# Patient Record
Sex: MALE | Race: WHITE | NOT HISPANIC OR LATINO | Employment: OTHER | URBAN - NONMETROPOLITAN AREA
[De-identification: names, ages, dates, MRNs, and addresses within clinical notes are randomized per-mention and may not be internally consistent; named-entity substitution may affect disease eponyms.]

---

## 2017-02-09 ENCOUNTER — OFFICE VISIT (OUTPATIENT)
Dept: CARDIOLOGY | Facility: CLINIC | Age: 82
End: 2017-02-09
Payer: MEDICARE

## 2017-02-09 VITALS
DIASTOLIC BLOOD PRESSURE: 70 MMHG | SYSTOLIC BLOOD PRESSURE: 90 MMHG | WEIGHT: 218.7 LBS | OXYGEN SATURATION: 93 % | HEIGHT: 74 IN | BODY MASS INDEX: 28.07 KG/M2 | HEART RATE: 82 BPM

## 2017-02-09 DIAGNOSIS — I48.20 CHRONIC ATRIAL FIBRILLATION (HCC): ICD-10-CM

## 2017-02-09 DIAGNOSIS — I10 ESSENTIAL HYPERTENSION: ICD-10-CM

## 2017-02-09 DIAGNOSIS — I50.9 HEART FAILURE, NYHA CLASS 2 (HCC): ICD-10-CM

## 2017-02-09 DIAGNOSIS — I42.9 CARDIOMYOPATHY (HCC): ICD-10-CM

## 2017-02-09 PROCEDURE — G8420 CALC BMI NORM PARAMETERS: HCPCS | Performed by: INTERNAL MEDICINE

## 2017-02-09 PROCEDURE — 4040F PNEUMOC VAC/ADMIN/RCVD: CPT | Mod: 8P | Performed by: INTERNAL MEDICINE

## 2017-02-09 PROCEDURE — 99214 OFFICE O/P EST MOD 30 MIN: CPT | Performed by: INTERNAL MEDICINE

## 2017-02-09 PROCEDURE — G8432 DEP SCR NOT DOC, RNG: HCPCS | Performed by: INTERNAL MEDICINE

## 2017-02-09 PROCEDURE — 1101F PT FALLS ASSESS-DOCD LE1/YR: CPT | Mod: 8P | Performed by: INTERNAL MEDICINE

## 2017-02-09 PROCEDURE — G8484 FLU IMMUNIZE NO ADMIN: HCPCS | Performed by: INTERNAL MEDICINE

## 2017-02-09 PROCEDURE — 1036F TOBACCO NON-USER: CPT | Performed by: INTERNAL MEDICINE

## 2017-02-09 RX ORDER — DULOXETINE HYDROCHLORIDE 30 MG/1
250 CAPSULE, DELAYED RELEASE ORAL
Refills: 0 | COMMUNITY
Start: 2016-11-30

## 2017-02-09 RX ORDER — FLUTICASONE PROPIONATE 50 MCG
SPRAY, SUSPENSION (ML) NASAL
Refills: 0 | COMMUNITY
Start: 2017-01-20

## 2017-02-09 RX ORDER — ESOMEPRAZOLE MAGNESIUM 40 MG/1
CAPSULE, DELAYED RELEASE ORAL
Refills: 0 | COMMUNITY
Start: 2017-01-30

## 2017-02-09 RX ORDER — METOPROLOL SUCCINATE 50 MG/1
50 TABLET, EXTENDED RELEASE ORAL DAILY
Refills: 0 | COMMUNITY
Start: 2016-11-30 | End: 2018-01-25

## 2017-02-09 RX ORDER — CLOPIDOGREL BISULFATE 75 MG/1
1 TABLET ORAL DAILY
Refills: 0 | COMMUNITY
Start: 2017-01-22 | End: 2017-07-13

## 2017-02-09 RX ORDER — FUROSEMIDE 80 MG
1 TABLET ORAL DAILY
Refills: 0 | COMMUNITY
Start: 2017-01-20

## 2017-02-09 RX ORDER — POTASSIUM CHLORIDE 20MEQ/15ML
LIQUID (ML) ORAL
Refills: 0 | COMMUNITY
Start: 2016-12-01 | End: 2018-04-05

## 2017-02-09 ASSESSMENT — ENCOUNTER SYMPTOMS
CHILLS: 0
PND: 0
BLURRED VISION: 0
CLAUDICATION: 0
DEPRESSION: 0
BLOOD IN STOOL: 0
EYE DISCHARGE: 0
LOSS OF CONSCIOUSNESS: 0
PALPITATIONS: 0
ABDOMINAL PAIN: 0
FEVER: 0
WEIGHT LOSS: 0
HEADACHES: 0
VOMITING: 0
DOUBLE VISION: 0
ORTHOPNEA: 0
MYALGIAS: 0
SENSORY CHANGE: 0
COUGH: 0
BRUISES/BLEEDS EASILY: 0
SHORTNESS OF BREATH: 0
SPEECH CHANGE: 0
DIZZINESS: 1
FALLS: 0
HALLUCINATIONS: 0
EYE PAIN: 0
NAUSEA: 0
BACK PAIN: 1

## 2017-02-09 NOTE — Clinical Note
[facility] [address] [place] [place] [address]  Phone: [phone]  Fax: [phone]              Bro Gutierrez  1935    Encounter Date: 2/9/2017    Fito Bradford M.D.          PROGRESS NOTE:  Subjective:   Bro Gutierrez is a 81 y.o. male who presents today in follow-up for mild cardiomyopathy and chronic atrial fibrillation. In December the patient was hospitalized after several emergency room visits for shortness of breath. He is the son states there was a 50 pound diuresis. These records are not available.  We reviewed his medications today  He reports no edema or shortness of breath and is comfortable with current status.  He is now taking Plavix for stroke prevention.  Previous discussion in October regarding guidelines for stroke prevention resulted in the patient and son decided not to take oral anticoagulants.      Past Medical History   Diagnosis Date   • Atrial fibrillation (CMS-HCC)    • Hypertension      History reviewed. No pertinent past surgical history.  Family History   Problem Relation Age of Onset   • Heart Disease Mother      History   Smoking status   • Never Smoker    Smokeless tobacco   • Never Used     Allergies   Allergen Reactions   • Penicillins      Outpatient Encounter Prescriptions as of 2/9/2017   Medication Sig Dispense Refill   • clopidogrel (PLAVIX) 75 MG Tab Take 1 Tab by mouth every day.  0   • DIGITEK 250 MCG Tab Take 250 mcg by mouth.  0   • esomeprazole (NEXIUM) 40 MG delayed-release capsule   0   • fluticasone (FLONASE) 50 MCG/ACT nasal spray   0   • furosemide (LASIX) 80 MG Tab Take 1 Tab by mouth every day.  0   • metoprolol SR (TOPROL XL) 50 MG TABLET SR 24 HR Take 50 mg by mouth every day.  0   • potassium chloride (KAYCIEL) 20 MEQ/15ML (10%) Solution   0   • tamsulosin (FLOMAX) 0.4 MG capsule   0   • gemfibrozil (LOPID) 600 MG Tab TAKE 1 TABLET BY MOUTH TWICE A DAY  0   • PROAIR  (90 BASE)  "MCG/ACT Aero Soln inhalation aerosol inhale 2 puffs by mouth every 4 hours  0   • lisinopril (PRINIVIL) 10 MG Tab Take 1 Tab by mouth every day. (Patient taking differently: Take 5 mg by mouth every day.) 30 Tab 11   • finasteride (PROSCAR) 5 MG Tab   0   • azithromycin (ZITHROMAX) 250 MG Tab take 2 tablets by mouth today then take 1 tablet DAILY FOR 4 DAYS  0   • Azelastine HCl 0.15 % Solution Spray 1 Spray in nose 2 Times a Day. Each Nostril  0   • metoprolol SR (TOPROL XL) 100 MG TABLET SR 24 HR Take 1 Tab by mouth every day. (Patient not taking: Reported on 2/9/2017) 30 Tab 11     No facility-administered encounter medications on file as of 2/9/2017.     Review of Systems   Constitutional: Negative for fever, chills, weight loss and malaise/fatigue.   HENT: Negative for ear discharge, ear pain, hearing loss and nosebleeds.    Eyes: Negative for blurred vision, double vision, pain and discharge.   Respiratory: Negative for cough and shortness of breath.    Cardiovascular: Negative for chest pain, palpitations, orthopnea, claudication, leg swelling and PND.   Gastrointestinal: Negative for nausea, vomiting, abdominal pain, blood in stool and melena.   Genitourinary: Negative for dysuria and hematuria.   Musculoskeletal: Positive for back pain. Negative for myalgias, joint pain and falls.   Skin: Negative for itching and rash.   Neurological: Positive for dizziness. Negative for sensory change, speech change, loss of consciousness and headaches.   Endo/Heme/Allergies: Negative for environmental allergies. Does not bruise/bleed easily.   Psychiatric/Behavioral: Negative for depression, suicidal ideas and hallucinations.        Objective:   BP 90/70 mmHg  Pulse 82  Ht 1.88 m (6' 2.02\")  Wt 99.2 kg (218 lb 11.1 oz)  BMI 28.07 kg/m2  SpO2 93%    Physical Exam   Constitutional: He is oriented to person, place, and time. He appears well-developed and well-nourished.   Elderly man using a walker for ambulation " accompanied by son   CHAYA:   Head: Normocephalic and atraumatic.   Eyes: EOM are normal.   Neck: Normal range of motion. No JVD present.   Cardiovascular: Normal rate, normal heart sounds and intact distal pulses.  Exam reveals no gallop and no friction rub.    No murmur heard.  There is presence of an irregularly irregular heartbeats.     Pulmonary/Chest: No respiratory distress. He has no wheezes. He has no rales. He exhibits no tenderness.   Abdominal: Soft. Bowel sounds are normal. There is no tenderness. There is no rebound and no guarding.   The is no presence of abdominal bruits   Musculoskeletal: Normal range of motion. He exhibits no edema or tenderness.   Neurological: He is alert and oriented to person, place, and time.   Skin: Skin is warm and dry.   Psychiatric: He has a normal mood and affect.   Nursing note and vitals reviewed.      Assessment:     1. Chronic atrial fibrillation (CMS-HCC)     2. Cardiomyopathy (CMS-HCC)     3. Heart failure, NYHA class 2 (CMS-HCC)     4. Essential hypertension         Medical Decision Making:  Today's Assessment / Status / Plan:   Current status is stable.  No evidence of CHF today  He is on Plavix for stroke prevention.  We discussed the guidelines again today  Return in 3 months  No change in medication  Get records from hospitalization in December        Alfredo Peña M.D.  [address] [place] [address]  VIA Facsimile: [phone]

## 2017-02-09 NOTE — PROGRESS NOTES
Subjective:   Bro Gutierrez is a 81 y.o. male who presents today in follow-up for mild cardiomyopathy and chronic atrial fibrillation. In December the patient was hospitalized after several emergency room visits for shortness of breath. He is the son states there was a 50 pound diuresis. These records are not available.  We reviewed his medications today  He reports no edema or shortness of breath and is comfortable with current status.  He is now taking Plavix for stroke prevention.  Previous discussion in October regarding guidelines for stroke prevention resulted in the patient and son decided not to take oral anticoagulants.      Past Medical History   Diagnosis Date   • Atrial fibrillation (CMS-[facility])    • Hypertension      History reviewed. No pertinent past surgical history.  Family History   Problem Relation Age of Onset   • Heart Disease Mother      History   Smoking status   • Never Smoker    Smokeless tobacco   • Never Used     Allergies   Allergen Reactions   • Penicillins      Outpatient Encounter Prescriptions as of 2/9/2017   Medication Sig Dispense Refill   • clopidogrel (PLAVIX) 75 MG Tab Take 1 Tab by mouth every day.  0   • DIGITEK 250 MCG Tab Take 250 mcg by mouth.  0   • esomeprazole (NEXIUM) 40 MG delayed-release capsule   0   • fluticasone (FLONASE) 50 MCG/ACT nasal spray   0   • furosemide (LASIX) 80 MG Tab Take 1 Tab by mouth every day.  0   • metoprolol SR (TOPROL XL) 50 MG TABLET SR 24 HR Take 50 mg by mouth every day.  0   • potassium chloride (KAYCIEL) 20 MEQ/15ML (10%) Solution   0   • tamsulosin (FLOMAX) 0.4 MG capsule   0   • gemfibrozil (LOPID) 600 MG Tab TAKE 1 TABLET BY MOUTH TWICE A DAY  0   • PROAIR  (90 BASE) MCG/ACT Aero Soln inhalation aerosol inhale 2 puffs by mouth every 4 hours  0   • lisinopril (PRINIVIL) 10 MG Tab Take 1 Tab by mouth every day. (Patient taking differently: Take 5 mg by mouth every day.) 30 Tab 11   • finasteride (PROSCAR) 5 MG Tab   0   • azithromycin  "(ZITHROMAX) 250 MG Tab take 2 tablets by mouth today then take 1 tablet DAILY FOR 4 DAYS  0   • Azelastine HCl 0.15 % Solution Spray 1 Spray in nose 2 Times a Day. Each Nostril  0   • metoprolol SR (TOPROL XL) 100 MG TABLET SR 24 HR Take 1 Tab by mouth every day. (Patient not taking: Reported on 2/9/2017) 30 Tab 11     No facility-administered encounter medications on file as of 2/9/2017.     Review of Systems   Constitutional: Negative for fever, chills, weight loss and malaise/fatigue.   HENT: Negative for ear discharge, ear pain, hearing loss and nosebleeds.    Eyes: Negative for blurred vision, double vision, pain and discharge.   Respiratory: Negative for cough and shortness of breath.    Cardiovascular: Negative for chest pain, palpitations, orthopnea, claudication, leg swelling and PND.   Gastrointestinal: Negative for nausea, vomiting, abdominal pain, blood in stool and melena.   Genitourinary: Negative for dysuria and hematuria.   Musculoskeletal: Positive for back pain. Negative for myalgias, joint pain and falls.   Skin: Negative for itching and rash.   Neurological: Positive for dizziness. Negative for sensory change, speech change, loss of consciousness and headaches.   Endo/Heme/Allergies: Negative for environmental allergies. Does not bruise/bleed easily.   Psychiatric/Behavioral: Negative for depression, suicidal ideas and hallucinations.        Objective:   BP 90/70 mmHg  Pulse 82  Ht 1.88 m (6' 2.02\")  Wt 99.2 kg (218 lb 11.1 oz)  BMI 28.07 kg/m2  SpO2 93%    Physical Exam   Constitutional: He is oriented to person, place, and time. He appears well-developed and well-nourished.   Elderly man using a walker for ambulation accompanied by son   HENT:   Head: Normocephalic and atraumatic.   Eyes: EOM are normal.   Neck: Normal range of motion. No JVD present.   Cardiovascular: Normal rate, normal heart sounds and intact distal pulses.  Exam reveals no gallop and no friction rub.    No murmur " heard.  There is presence of an irregularly irregular heartbeats.     Pulmonary/Chest: No respiratory distress. He has no wheezes. He has no rales. He exhibits no tenderness.   Abdominal: Soft. Bowel sounds are normal. There is no tenderness. There is no rebound and no guarding.   The is no presence of abdominal bruits   Musculoskeletal: Normal range of motion. He exhibits no edema or tenderness.   Neurological: He is alert and oriented to person, place, and time.   Skin: Skin is warm and dry.   Psychiatric: He has a normal mood and affect.   Nursing note and vitals reviewed.      Assessment:     1. Chronic atrial fibrillation (CMS-HCC)     2. Cardiomyopathy (CMS-HCC)     3. Heart failure, NYHA class 2 (CMS-HCC)     4. Essential hypertension         Medical Decision Making:  Today's Assessment / Status / Plan:   Current status is stable.  No evidence of CHF today  He is on Plavix for stroke prevention.  We discussed the guidelines again today  Return in 3 months  No change in medication  Get records from hospitalization in December

## 2017-06-22 ENCOUNTER — OFFICE VISIT (OUTPATIENT)
Dept: CARDIOLOGY | Facility: CLINIC | Age: 82
End: 2017-06-22
Payer: MEDICARE

## 2017-06-22 VITALS
HEART RATE: 80 BPM | SYSTOLIC BLOOD PRESSURE: 124 MMHG | DIASTOLIC BLOOD PRESSURE: 70 MMHG | WEIGHT: 215 LBS | OXYGEN SATURATION: 96 % | BODY MASS INDEX: 27.59 KG/M2 | HEIGHT: 74 IN

## 2017-06-22 DIAGNOSIS — I50.9 HEART FAILURE, NYHA CLASS 3 (HCC): ICD-10-CM

## 2017-06-22 DIAGNOSIS — I10 ESSENTIAL HYPERTENSION: ICD-10-CM

## 2017-06-22 DIAGNOSIS — I50.20 ACC/AHA STAGE C SYSTOLIC HEART FAILURE (HCC): ICD-10-CM

## 2017-06-22 DIAGNOSIS — I48.20 CHRONIC ATRIAL FIBRILLATION (HCC): ICD-10-CM

## 2017-06-22 PROCEDURE — 99214 OFFICE O/P EST MOD 30 MIN: CPT | Performed by: INTERNAL MEDICINE

## 2017-06-22 ASSESSMENT — ENCOUNTER SYMPTOMS
EYE DISCHARGE: 0
DIZZINESS: 0
FEVER: 0
VOMITING: 0
CLAUDICATION: 0
HEADACHES: 0
DOUBLE VISION: 0
WEIGHT LOSS: 0
CHILLS: 0
HALLUCINATIONS: 0
DEPRESSION: 0
NAUSEA: 0
SHORTNESS OF BREATH: 1
PND: 0
BRUISES/BLEEDS EASILY: 0
LOSS OF CONSCIOUSNESS: 0
EYE PAIN: 0
FALLS: 0
BLOOD IN STOOL: 0
SPEECH CHANGE: 0
SENSORY CHANGE: 0
PALPITATIONS: 0
COUGH: 0
BLURRED VISION: 0
MYALGIAS: 0
ORTHOPNEA: 0
ABDOMINAL PAIN: 0

## 2017-06-22 NOTE — Clinical Note
[facility] [address] [place] [address]  Phone: [phone]  Fax: [phone]              Bro Gutierrez  1935    Encounter Date: 6/22/2017    Crys Jorge M.D.          PROGRESS NOTE:  Subjective:   Bro Gutierrez is a 82 y.o. male who presents today for cardiac evaluation and care after his prior hospitalization due to HF exacerbation in 12/2016. Patient was seen in the hospital and found to have atrial fibirilllation with rapid ventricular rate. Patient was seen by cardiology and elevated troponin was thought to be attributed by tachycardia. In terms of his history of atrial fibrillation, patient did not have close cardiac care. He was told in the past not to worry about it and was not on any type of anticoagulation. He feels well overall. No chest pain.    LVEF is 45% on TTE (could be underestimated due to presence of atrial fib).    Walks with a walker.  Has moved into assisted living now.    No clinical change since last visit.    Past Medical History   Diagnosis Date   • Atrial fibrillation (CMS-HCC)    • Hypertension      History reviewed. No pertinent past surgical history.  Family History   Problem Relation Age of Onset   • Heart Disease Mother      History   Smoking status   • Never Smoker    Smokeless tobacco   • Never Used     Allergies   Allergen Reactions   • Penicillins      Outpatient Encounter Prescriptions as of 6/22/2017   Medication Sig Dispense Refill   • clopidogrel (PLAVIX) 75 MG Tab Take 1 Tab by mouth every day.  0   • DIGITEK 250 MCG Tab Take 250 mcg by mouth.  0   • esomeprazole (NEXIUM) 40 MG delayed-release capsule   0   • fluticasone (FLONASE) 50 MCG/ACT nasal spray   0   • furosemide (LASIX) 80 MG Tab Take 1 Tab by mouth every day.  0   • metoprolol SR (TOPROL XL) 50 MG TABLET SR 24 HR Take 50 mg by mouth every day.  0   • potassium chloride (KAYCIEL) 20 MEQ/15ML (10%) Solution   0   • tamsulosin  "(FLOMAX) 0.4 MG capsule   0   • gemfibrozil (LOPID) 600 MG Tab TAKE 1 TABLET BY MOUTH TWICE A DAY  0   • finasteride (PROSCAR) 5 MG Tab   0   • azithromycin (ZITHROMAX) 250 MG Tab take 2 tablets by mouth today then take 1 tablet DAILY FOR 4 DAYS  0   • Azelastine HCl 0.15 % Solution Spray 1 Spray in nose 2 Times a Day. Each Nostril  0   • PROAIR  (90 BASE) MCG/ACT Aero Soln inhalation aerosol inhale 2 puffs by mouth every 4 hours  0   • metoprolol SR (TOPROL XL) 100 MG TABLET SR 24 HR Take 1 Tab by mouth every day. 30 Tab 11   • lisinopril (PRINIVIL) 10 MG Tab Take 1 Tab by mouth every day. (Patient taking differently: Take 5 mg by mouth every day.) 30 Tab 11     No facility-administered encounter medications on file as of 6/22/2017.     Review of Systems   Constitutional: Negative for fever, chills, weight loss and malaise/fatigue.   HENT: Negative for ear discharge, ear pain, hearing loss and nosebleeds.    Eyes: Negative for blurred vision, double vision, pain and discharge.   Respiratory: Positive for shortness of breath. Negative for cough.    Cardiovascular: Negative for chest pain, palpitations, orthopnea, claudication, leg swelling and PND.   Gastrointestinal: Negative for nausea, vomiting, abdominal pain, blood in stool and melena.   Genitourinary: Negative for dysuria and hematuria.   Musculoskeletal: Negative for myalgias, joint pain and falls.   Skin: Negative for itching and rash.   Neurological: Negative for dizziness, sensory change, speech change, loss of consciousness and headaches.   Endo/Heme/Allergies: Negative for environmental allergies. Does not bruise/bleed easily.   Psychiatric/Behavioral: Negative for depression, suicidal ideas and hallucinations.        Objective:   /70 mmHg  Pulse 80  Ht 1.88 m (6' 2\")  Wt 97.523 kg (215 lb)  BMI 27.59 kg/m2  SpO2 96%    Physical Exam   Constitutional: He is oriented to person, place, and time. No distress.   HENT:   Head: Normocephalic " and atraumatic.   Eyes: EOM are normal.   Neck: Normal range of motion. No JVD present.   Cardiovascular: Normal rate, normal heart sounds and intact distal pulses.  Exam reveals no gallop and no friction rub.    No murmur heard.  There is presence of an irregularly irregular heartbeats.     Pulmonary/Chest: No respiratory distress. He has no wheezes. He has no rales. He exhibits no tenderness.   Abdominal: Soft. Bowel sounds are normal. There is no tenderness. There is no rebound and no guarding.   The is no presence of abdominal bruits   Musculoskeletal: He exhibits no edema or tenderness.   Neurological: He is alert and oriented to person, place, and time.   Skin: Skin is warm and dry.   Psychiatric: He has a normal mood and affect.   Nursing note and vitals reviewed.      Assessment:     1. ACC/AHA stage C systolic heart failure (CMS-HCC)     2. Chronic atrial fibrillation (CMS-HCC)     3. Essential hypertension     4. Heart failure, NYHA class 3 (CMS-HCC)         Medical Decision Making:  Today's Assessment / Status / Plan:     Today, based on physical examination findings, patient is euvolemic. No JVD, lungs are clear to auscultation, no pitting edema in bilateral lower extremities, no ascites.    Based on the overall clinical history and profile, patient is a good candidate for Cardiomems implantation system to remotely monitor intracardiac pressures. he will benefit from reduced recurrent hospitalization for heart failure exacerbation and also quality of life improvement. Patient also has a good support system and has shown compliance to medical therapy. he is motivated and will be a successful candidate.    In the meantime, cont current medications at current dose for optimal rate control.      Alfredo Peña M.D.  [address] [place] [address] [place] [address]  VIA Facsimile: [phone]

## 2017-06-22 NOTE — PROGRESS NOTES
Subjective:   Bro Gutierrez is a 82 y.o. male who presents today for cardiac evaluation and care after his prior hospitalization due to HF exacerbation in 12/2016. Patient was seen in the hospital and found to have atrial fibirilllation with rapid ventricular rate. Patient was seen by cardiology and elevated troponin was thought to be attributed by tachycardia. In terms of his history of atrial fibrillation, patient did not have close cardiac care. He was told in the past not to worry about it and was not on any type of anticoagulation. He feels well overall. No chest pain.    LVEF is 45% on TTE (could be underestimated due to presence of atrial fib).    Walks with a walker.  Has moved into assisted living now.    No clinical change since last visit.    Past Medical History   Diagnosis Date   • Atrial fibrillation (CMS-HCC)    • Hypertension      History reviewed. No pertinent past surgical history.  Family History   Problem Relation Age of Onset   • Heart Disease Mother      History   Smoking status   • Never Smoker    Smokeless tobacco   • Never Used     Allergies   Allergen Reactions   • Penicillins      Outpatient Encounter Prescriptions as of 6/22/2017   Medication Sig Dispense Refill   • clopidogrel (PLAVIX) 75 MG Tab Take 1 Tab by mouth every day.  0   • DIGITEK 250 MCG Tab Take 250 mcg by mouth.  0   • esomeprazole (NEXIUM) 40 MG delayed-release capsule   0   • fluticasone (FLONASE) 50 MCG/ACT nasal spray   0   • furosemide (LASIX) 80 MG Tab Take 1 Tab by mouth every day.  0   • metoprolol SR (TOPROL XL) 50 MG TABLET SR 24 HR Take 50 mg by mouth every day.  0   • potassium chloride (KAYCIEL) 20 MEQ/15ML (10%) Solution   0   • tamsulosin (FLOMAX) 0.4 MG capsule   0   • gemfibrozil (LOPID) 600 MG Tab TAKE 1 TABLET BY MOUTH TWICE A DAY  0   • finasteride (PROSCAR) 5 MG Tab   0   • azithromycin (ZITHROMAX) 250 MG Tab take 2 tablets by mouth today then take 1 tablet DAILY FOR 4 DAYS  0   • Azelastine HCl 0.15 %  "Solution Spray 1 Spray in nose 2 Times a Day. Each Nostril  0   • PROAIR  (90 BASE) MCG/ACT Aero Soln inhalation aerosol inhale 2 puffs by mouth every 4 hours  0   • metoprolol SR (TOPROL XL) 100 MG TABLET SR 24 HR Take 1 Tab by mouth every day. 30 Tab 11   • lisinopril (PRINIVIL) 10 MG Tab Take 1 Tab by mouth every day. (Patient taking differently: Take 5 mg by mouth every day.) 30 Tab 11     No facility-administered encounter medications on file as of 6/22/2017.     Review of Systems   Constitutional: Negative for fever, chills, weight loss and malaise/fatigue.   HENT: Negative for ear discharge, ear pain, hearing loss and nosebleeds.    Eyes: Negative for blurred vision, double vision, pain and discharge.   Respiratory: Positive for shortness of breath. Negative for cough.    Cardiovascular: Negative for chest pain, palpitations, orthopnea, claudication, leg swelling and PND.   Gastrointestinal: Negative for nausea, vomiting, abdominal pain, blood in stool and melena.   Genitourinary: Negative for dysuria and hematuria.   Musculoskeletal: Negative for myalgias, joint pain and falls.   Skin: Negative for itching and rash.   Neurological: Negative for dizziness, sensory change, speech change, loss of consciousness and headaches.   Endo/Heme/Allergies: Negative for environmental allergies. Does not bruise/bleed easily.   Psychiatric/Behavioral: Negative for depression, suicidal ideas and hallucinations.        Objective:   /70 mmHg  Pulse 80  Ht 1.88 m (6' 2\")  Wt 97.523 kg (215 lb)  BMI 27.59 kg/m2  SpO2 96%    Physical Exam   Constitutional: He is oriented to person, place, and time. No distress.   HENT:   Head: Normocephalic and atraumatic.   Eyes: EOM are normal.   Neck: Normal range of motion. No JVD present.   Cardiovascular: Normal rate, normal heart sounds and intact distal pulses.  Exam reveals no gallop and no friction rub.    No murmur heard.  There is presence of an irregularly " irregular heartbeats.     Pulmonary/Chest: No respiratory distress. He has no wheezes. He has no rales. He exhibits no tenderness.   Abdominal: Soft. Bowel sounds are normal. There is no tenderness. There is no rebound and no guarding.   The is no presence of abdominal bruits   Musculoskeletal: He exhibits no edema or tenderness.   Neurological: He is alert and oriented to person, place, and time.   Skin: Skin is warm and dry.   Psychiatric: He has a normal mood and affect.   Nursing note and vitals reviewed.      Assessment:     1. ACC/AHA stage C systolic heart failure (CMS-HCC)     2. Chronic atrial fibrillation (CMS-HCC)     3. Essential hypertension     4. Heart failure, NYHA class 3 (CMS-HCC)         Medical Decision Making:  Today's Assessment / Status / Plan:     Today, based on physical examination findings, patient is euvolemic. No JVD, lungs are clear to auscultation, no pitting edema in bilateral lower extremities, no ascites.    Based on the overall clinical history and profile, patient is a good candidate for Cardiomems implantation system to remotely monitor intracardiac pressures. he will benefit from reduced recurrent hospitalization for heart failure exacerbation and also quality of life improvement. Patient also has a good support system and has shown compliance to medical therapy. he is motivated and will be a successful candidate.    In the meantime, cont current medications at current dose for optimal rate control.

## 2017-07-13 ENCOUNTER — TELEPHONE (OUTPATIENT)
Dept: CARDIOLOGY | Facility: MEDICAL CENTER | Age: 82
End: 2017-07-13

## 2017-07-13 NOTE — TELEPHONE ENCOUNTER
Question about patient taking Plavix  Received: Today       PATY Brambila/Judith     Please call Amanda at Dr Peña's office at [phone]. She wants to find out if the patient is still taking Plavix.       Returned call. Not in notes and med still on med rec. Will clarify with TT. TT states OK for patient to stop Plavix. Amanda notified. Med taken off med rec.

## 2017-10-05 ENCOUNTER — TELEPHONE (OUTPATIENT)
Dept: CARDIOLOGY | Facility: MEDICAL CENTER | Age: 82
End: 2017-10-05

## 2017-10-05 NOTE — TELEPHONE ENCOUNTER
Amanda at Dr Peña's office at [phone] called in response to Dr. Peña's letter dated 9/26/2017    We discuss our previous conversation 7/13/2017. That patient is not on Plavix to our knowledge. She states pt lives in a care home and she received a recent med list during patients last visit and plavix is not on there either, but Aspirin 325 mg is.     Amanda will update Dr. Peña and keep us advised if anything further is needed.

## 2017-10-17 ENCOUNTER — TELEPHONE (OUTPATIENT)
Dept: CARDIOLOGY | Facility: MEDICAL CENTER | Age: 82
End: 2017-10-17

## 2017-10-17 NOTE — TELEPHONE ENCOUNTER
Phone number on file called. Pts son answers. He states he has no idea what doses his Dad takes. He asks that I call the assisted living where the patient resides [facility] @ [phone].     Called, Left message with staff, will have staff call back with Lisinopril dose.

## 2017-10-23 RX ORDER — LISINOPRIL 5 MG/1
5 TABLET ORAL DAILY
Qty: 90 TAB | Refills: 3 | Status: SHIPPED | OUTPATIENT
Start: 2017-10-23

## 2017-10-23 NOTE — TELEPHONE ENCOUNTER
Called [facility] @ [phone] again. Spoke with Rosina. She confirms pt is taking 1/2 tab of Lisinopril 10 mg daily. Pt is taking 5mg daily.

## 2017-12-22 ENCOUNTER — TELEPHONE (OUTPATIENT)
Dept: CARDIOLOGY | Facility: MEDICAL CENTER | Age: 82
End: 2017-12-22

## 2017-12-22 NOTE — TELEPHONE ENCOUNTER
To Dr. Jorge for review,  Marilia GUTIERREZ RN     ----- Message from Ange [place] sent at 12/22/2017 11:14 AM PST -----  Regarding: doctor to doctor  Contact: [phone]  TT/marilia Ordonez,  at [facility] calling on behalf of Dr Edouard Hdez.   Dr Hdez wishes to have doctor to doctor conversation with TT, in between patients, to discuss pt's meds.    Dr Jaime # [phone]    If questions, Mery # [phone]

## 2018-01-25 ENCOUNTER — OFFICE VISIT (OUTPATIENT)
Dept: CARDIOLOGY | Facility: CLINIC | Age: 83
End: 2018-01-25
Payer: MEDICARE

## 2018-01-25 VITALS
SYSTOLIC BLOOD PRESSURE: 120 MMHG | BODY MASS INDEX: 27.59 KG/M2 | OXYGEN SATURATION: 94 % | WEIGHT: 215 LBS | DIASTOLIC BLOOD PRESSURE: 88 MMHG | HEIGHT: 74 IN | HEART RATE: 50 BPM

## 2018-01-25 DIAGNOSIS — I10 ESSENTIAL HYPERTENSION: ICD-10-CM

## 2018-01-25 DIAGNOSIS — I50.9 HEART FAILURE, NYHA CLASS 3 (HCC): ICD-10-CM

## 2018-01-25 DIAGNOSIS — I48.20 CHRONIC ATRIAL FIBRILLATION (HCC): ICD-10-CM

## 2018-01-25 DIAGNOSIS — I50.20 ACC/AHA STAGE C SYSTOLIC HEART FAILURE (HCC): ICD-10-CM

## 2018-01-25 PROCEDURE — 99497 ADVNCD CARE PLAN 30 MIN: CPT | Performed by: INTERNAL MEDICINE

## 2018-01-25 PROCEDURE — 99215 OFFICE O/P EST HI 40 MIN: CPT | Performed by: INTERNAL MEDICINE

## 2018-01-25 RX ORDER — METOPROLOL SUCCINATE 100 MG/1
100 TABLET, EXTENDED RELEASE ORAL DAILY
Qty: 30 TAB | Refills: 11 | Status: SHIPPED | OUTPATIENT
Start: 2018-01-25 | End: 2018-02-08

## 2018-01-25 RX ORDER — CARVEDILOL 3.12 MG/1
TABLET ORAL
Refills: 0 | COMMUNITY
Start: 2018-01-18 | End: 2018-01-25

## 2018-01-25 ASSESSMENT — ENCOUNTER SYMPTOMS
EYE DISCHARGE: 0
PND: 0
SENSORY CHANGE: 0
DIAPHORESIS: 0
DEPRESSION: 0
VOMITING: 0
HALLUCINATIONS: 0
DOUBLE VISION: 0
CHILLS: 0
EYE PAIN: 0
DIZZINESS: 0
FALLS: 0
BLURRED VISION: 0
SPEECH CHANGE: 0
MEMORY LOSS: 0
FEVER: 0
COUGH: 0
CLAUDICATION: 0
PALPITATIONS: 0
HEADACHES: 0
ORTHOPNEA: 0
NAUSEA: 0
MYALGIAS: 0
LOSS OF CONSCIOUSNESS: 0
SHORTNESS OF BREATH: 0
WEIGHT LOSS: 0
BRUISES/BLEEDS EASILY: 0
ABDOMINAL PAIN: 0
BLOOD IN STOOL: 0

## 2018-01-25 NOTE — LETTER
[facility] [address] [place] [address]  Phone: [phone]  Fax: [phone]              Bro Matt  1935    Encounter Date: 1/25/2018    Crys Jorge M.D.          PROGRESS NOTE:  No notes on file      Alfredo Peña M.D.  [address] [place] [address]  VIA Facsimile: [phone]

## 2018-01-25 NOTE — PROGRESS NOTES
Subjective:   Bro Gutierrez is a 82 y.o. male who presents today for cardiac evaluation and care after his prior hospitalization due to HF exacerbation in 12/2016 and again in 12/2017. Patient was seen in the hospital and found to have atrial fibirilllation with rapid ventricular rate. He did not take his Digoxin.     Patient still gets winded with daily living activities and exertion. No symptoms at rest.    He is taking Coreg 3.125 mg po bid, Metoprolol tartrate 50 mg po daily, Lisinopril 5 mg po daily. Lasix 80 mg po daily. Very unusual regimen of BB. This is according to paper record. Patient does not know exactly what he is taking.     LVEF is 30% on most recent TTE.     Walks with a walker.  Has moved into assisted living now.     No clinical change since last visit.    Chief Complaint: dyspnea.    Past Medical History:   Diagnosis Date   • Atrial fibrillation (CMS-HCC)    • Hypertension      History reviewed. No pertinent surgical history.  Family History   Problem Relation Age of Onset   • Heart Disease Mother      History   Smoking Status   • Never Smoker   Smokeless Tobacco   • Never Used     Allergies   Allergen Reactions   • Penicillins      Outpatient Encounter Prescriptions as of 1/25/2018   Medication Sig Dispense Refill   • metoprolol SR (TOPROL XL) 100 MG TABLET SR 24 HR Take 1 Tab by mouth every day. 30 Tab 11   • lisinopril (PRINIVIL) 5 MG Tab Take 1 Tab by mouth every day. 90 Tab 3   • DIGITEK 250 MCG Tab Take 250 mcg by mouth.  0   • esomeprazole (NEXIUM) 40 MG delayed-release capsule   0   • fluticasone (FLONASE) 50 MCG/ACT nasal spray   0   • furosemide (LASIX) 80 MG Tab Take 1 Tab by mouth every day.  0   • potassium chloride (KAYCIEL) 20 MEQ/15ML (10%) Solution   0   • tamsulosin (FLOMAX) 0.4 MG capsule   0   • gemfibrozil (LOPID) 600 MG Tab TAKE 1 TABLET BY MOUTH TWICE A DAY  0   • finasteride (PROSCAR) 5 MG Tab   0   • azithromycin (ZITHROMAX) 250 MG Tab take 2 tablets by mouth today then  "take 1 tablet DAILY FOR 4 DAYS  0   • Azelastine HCl 0.15 % Solution Spray 1 Spray in nose 2 Times a Day. Each Nostril  0   • PROAIR  (90 BASE) MCG/ACT Aero Soln inhalation aerosol inhale 2 puffs by mouth every 4 hours  0   • aspirin EC (ECOTRIN) 325 MG Tablet Delayed Response   0   • [DISCONTINUED] carvedilol (COREG) 3.125 MG Tab   0   • [DISCONTINUED] metoprolol SR (TOPROL XL) 50 MG TABLET SR 24 HR Take 50 mg by mouth every day.  0   • [DISCONTINUED] metoprolol SR (TOPROL XL) 100 MG TABLET SR 24 HR Take 1 Tab by mouth every day. 30 Tab 11     No facility-administered encounter medications on file as of 1/25/2018.      Review of Systems   Constitutional: Negative for chills, diaphoresis, fever, malaise/fatigue and weight loss.   HENT: Negative for ear discharge, ear pain, hearing loss and nosebleeds.    Eyes: Negative for blurred vision, double vision, pain and discharge.   Respiratory: Negative for cough and shortness of breath.    Cardiovascular: Negative for chest pain, palpitations, orthopnea, claudication, leg swelling and PND.   Gastrointestinal: Negative for abdominal pain, blood in stool, melena, nausea and vomiting.   Genitourinary: Negative for dysuria, frequency and hematuria.   Musculoskeletal: Negative for falls, joint pain and myalgias.   Skin: Negative for itching and rash.   Neurological: Negative for dizziness, sensory change, speech change, loss of consciousness and headaches.   Endo/Heme/Allergies: Negative for environmental allergies. Does not bruise/bleed easily.   Psychiatric/Behavioral: Negative for depression, hallucinations, memory loss and suicidal ideas.        Objective:   /88   Pulse (!) 50   Ht 1.88 m (6' 2\")   Wt 97.5 kg (215 lb)   SpO2 94%   BMI 27.60 kg/m²     Physical Exam   Constitutional: He is oriented to person, place, and time. No distress.   HENT:   Head: Normocephalic and atraumatic.   Eyes: EOM are normal.   Neck: Normal range of motion. No JVD present. "   Cardiovascular: Normal rate, normal heart sounds and intact distal pulses.  Exam reveals no gallop and no friction rub.    No murmur heard.  There is presence of an irregularly irregular heartbeats.     Pulmonary/Chest: No respiratory distress. He has no wheezes. He has no rales. He exhibits no tenderness.   Abdominal: Soft. Bowel sounds are normal. There is no tenderness. There is no rebound and no guarding.   The is no presence of abdominal bruits   Musculoskeletal: Normal range of motion.   Neurological: He is alert and oriented to person, place, and time.   Skin: Skin is warm and dry.   Psychiatric: He has a normal mood and affect.   Nursing note and vitals reviewed.      Assessment:     1. ACC/AHA stage C systolic heart failure (CMS-HCC)  metoprolol SR (TOPROL XL) 100 MG TABLET SR 24 HR   2. Heart failure, NYHA class 3 (CMS-HCC)  metoprolol SR (TOPROL XL) 100 MG TABLET SR 24 HR   3. Chronic atrial fibrillation (CMS-HCC)  metoprolol SR (TOPROL XL) 100 MG TABLET SR 24 HR   4. Essential hypertension         Medical Decision Making:  Today's Assessment / Status / Plan:   Today, based on physical examination findings, patient is euvolemic. No JVD, lungs are clear to auscultation, no pitting edema in bilateral lower extremities, no ascites.    Dry weight is 215 lbs.    Very challenging patient.  I advised patient to bring all of his medication bottles with him at next visit to make sure that he is taking the right medications.  In the meantime, we will stop Carvedilol  Will substitute with Toprol 100 mg po daily.  Continue Lasix 80 mg po daily.  Not good candidate for Spironolactone at this time. Will re-evaluate in the future.  Not good candidate for anticoagulation due to fall risk.    I spent 30 minutes talking to patient face to face about end of life issues. I already had POLST form filled out. He does not want any invasive procedure done outside of [place]. He has trouble getting to [place]. Therefore, ICD is  not a good option for him. Cardiomems implantation is not good option for him.    I will see patient back in our Clinic with in 1 week for closer monitor.    I thank you for referring patient to our Heart Failure Clinic today.

## 2018-02-08 ENCOUNTER — OFFICE VISIT (OUTPATIENT)
Dept: CARDIOLOGY | Facility: CLINIC | Age: 83
End: 2018-02-08
Payer: MEDICARE

## 2018-02-08 VITALS
SYSTOLIC BLOOD PRESSURE: 130 MMHG | HEIGHT: 74 IN | BODY MASS INDEX: 26.95 KG/M2 | HEART RATE: 65 BPM | WEIGHT: 210 LBS | OXYGEN SATURATION: 98 % | DIASTOLIC BLOOD PRESSURE: 82 MMHG

## 2018-02-08 DIAGNOSIS — I48.20 CHRONIC ATRIAL FIBRILLATION (HCC): ICD-10-CM

## 2018-02-08 DIAGNOSIS — I50.9 HEART FAILURE, NYHA CLASS 3 (HCC): ICD-10-CM

## 2018-02-08 DIAGNOSIS — Z79.899 HIGH RISK MEDICATION USE: ICD-10-CM

## 2018-02-08 DIAGNOSIS — I50.20 ACC/AHA STAGE C SYSTOLIC HEART FAILURE (HCC): ICD-10-CM

## 2018-02-08 DIAGNOSIS — I10 HTN (HYPERTENSION), MALIGNANT: ICD-10-CM

## 2018-02-08 PROCEDURE — 99215 OFFICE O/P EST HI 40 MIN: CPT | Performed by: INTERNAL MEDICINE

## 2018-02-08 RX ORDER — METOPROLOL SUCCINATE 50 MG/1
50 TABLET, EXTENDED RELEASE ORAL DAILY
Qty: 90 TAB | Refills: 3 | Status: SHIPPED | OUTPATIENT
Start: 2018-02-08 | End: 2018-04-05

## 2018-02-08 ASSESSMENT — ENCOUNTER SYMPTOMS
PALPITATIONS: 0
ABDOMINAL PAIN: 0
FALLS: 0
DIAPHORESIS: 0
HEADACHES: 0
DEPRESSION: 0
BRUISES/BLEEDS EASILY: 0
FEVER: 0
MYALGIAS: 0
BLURRED VISION: 0
MEMORY LOSS: 0
COUGH: 0
SENSORY CHANGE: 0
SHORTNESS OF BREATH: 1
DOUBLE VISION: 0
DIZZINESS: 0

## 2018-02-08 NOTE — LETTER
[facility] [address] [place] [address]  Phone: [phone]  Fax: [phone]              Bro Gutierrez  1935    Encounter Date: 2/8/2018    Crys Jorge M.D.          PROGRESS NOTE:  Subjective:   Bro Gutierrez is a 82 y.o. male who presents today for cardiac evaluation and care after his prior hospitalization due to HF exacerbation in 12/2016 and again in 12/2017. Patient was seen in the hospital and found to have atrial fibirilllation with rapid ventricular rate. He did not take his Digoxin.      Patient still gets winded with daily living activities and exertion. No symptoms at rest.     He is taking  Metoprolol ER 50 mg po daily, Lisinopril 5 mg po daily. Lasix 80 mg po daily.      LVEF is 30% on most recent TTE.     Walks with a walker.  Has moved into assisted living now.     No clinical change since last visit.     Chief Complaint: dyspnea.       Past Medical History:   Diagnosis Date   • Atrial fibrillation (CMS-HCC)    • Hypertension      History reviewed. No pertinent surgical history.  Family History   Problem Relation Age of Onset   • Heart Disease Mother      History   Smoking Status   • Never Smoker   Smokeless Tobacco   • Never Used     Allergies   Allergen Reactions   • Penicillins      Outpatient Encounter Prescriptions as of 2/8/2018   Medication Sig Dispense Refill   • metoprolol SR (TOPROL XL) 50 MG TABLET SR 24 HR Take 1 Tab by mouth every day. 90 Tab 3   • aspirin EC (ECOTRIN) 325 MG Tablet Delayed Response   0   • lisinopril (PRINIVIL) 5 MG Tab Take 1 Tab by mouth every day. 90 Tab 3   • DIGITEK 250 MCG Tab Take 250 mcg by mouth.  0   • esomeprazole (NEXIUM) 40 MG delayed-release capsule   0   • fluticasone (FLONASE) 50 MCG/ACT nasal spray   0   • furosemide (LASIX) 80 MG Tab Take 1 Tab by mouth every day.  0   • potassium chloride (KAYCIEL) 20 MEQ/15ML (10%) Solution   0   • tamsulosin (FLOMAX) 0.4 MG capsule  "  0   • gemfibrozil (LOPID) 600 MG Tab TAKE 1 TABLET BY MOUTH TWICE A DAY  0   • finasteride (PROSCAR) 5 MG Tab   0   • azithromycin (ZITHROMAX) 250 MG Tab take 2 tablets by mouth today then take 1 tablet DAILY FOR 4 DAYS  0   • Azelastine HCl 0.15 % Solution Spray 1 Spray in nose 2 Times a Day. Each Nostril  0   • PROAIR  (90 BASE) MCG/ACT Aero Soln inhalation aerosol inhale 2 puffs by mouth every 4 hours  0   • [DISCONTINUED] metoprolol SR (TOPROL XL) 100 MG TABLET SR 24 HR Take 1 Tab by mouth every day. 30 Tab 11     No facility-administered encounter medications on file as of 2/8/2018.      Review of Systems   Constitutional: Negative for diaphoresis and fever.   HENT: Negative for nosebleeds.    Eyes: Negative for blurred vision and double vision.   Respiratory: Positive for shortness of breath. Negative for cough.    Cardiovascular: Negative for chest pain and palpitations.   Gastrointestinal: Negative for abdominal pain.   Genitourinary: Negative for dysuria and frequency.   Musculoskeletal: Negative for falls and myalgias.   Skin: Negative for rash.   Neurological: Negative for dizziness, sensory change and headaches.   Endo/Heme/Allergies: Does not bruise/bleed easily.   Psychiatric/Behavioral: Negative for depression and memory loss.        Objective:   /82   Pulse 65   Ht 1.88 m (6' 2\")   Wt 95.3 kg (210 lb)   SpO2 98%   BMI 26.96 kg/m²      Physical Exam   Constitutional: He is oriented to person, place, and time. No distress.   HENT:   Head: Normocephalic and atraumatic.   Eyes: Right eye exhibits no discharge. Left eye exhibits no discharge.   Neck: No JVD present.   Cardiovascular: Exam reveals no friction rub.    No murmur heard.  There is presence of an irregularly irregular heartbeats.     Pulmonary/Chest: No respiratory distress.   Abdominal: He exhibits no distension. There is no tenderness.   Musculoskeletal: He exhibits no edema or tenderness.   Neurological: He is alert and " oriented to person, place, and time.   Skin: Skin is warm and dry.   Psychiatric: He has a normal mood and affect.   Nursing note and vitals reviewed.      Assessment:     1. ACC/AHA stage C systolic heart failure (CMS-HCC)  metoprolol SR (TOPROL XL) 50 MG TABLET SR 24 HR   2. Heart failure, NYHA class 3 (CMS-HCC)  metoprolol SR (TOPROL XL) 50 MG TABLET SR 24 HR   3. Chronic atrial fibrillation (CMS-HCC)  metoprolol SR (TOPROL XL) 50 MG TABLET SR 24 HR   4. HTN (hypertension), malignant  metoprolol SR (TOPROL XL) 50 MG TABLET SR 24 HR   5. High risk medication use         Medical Decision Making:  Today's Assessment / Status / Plan:   Today, based on physical examination findings, patient is euvolemic. No JVD, lungs are clear to auscultation, no pitting edema in bilateral lower extremities, no ascites.    Dry weight is 210 lbs.    Continue Toprol 50 mg po daily, Lisinopril 5 mg po daily, lasix 80 mg po daily.    Not good candidate for Spironolactone at this time. Will re-evaluate in the future.  Not good candidate for anticoagulation due to fall risk.    POLST filled out last visit.    Will continue to closely monitor for side effects of patient's high risk medication(s) including liver, renal function and electrolytes.    I will see patient back in our Heart Failure Clinic with lab tests and studies results in 12 weeks.    I thank you for referring patient to our Heart Failure Clinic today.          Alfredo Peña M.D.  [address] [place] [address]  VIA Facsimile: [phone]

## 2018-02-08 NOTE — PROGRESS NOTES
Subjective:   Bro Gutierrez is a 82 y.o. male who presents today for cardiac evaluation and care after his prior hospitalization due to HF exacerbation in 12/2016 and again in 12/2017. Patient was seen in the hospital and found to have atrial fibirilllation with rapid ventricular rate. He did not take his Digoxin.      Patient still gets winded with daily living activities and exertion. No symptoms at rest.     He is taking  Metoprolol ER 50 mg po daily, Lisinopril 5 mg po daily. Lasix 80 mg po daily.      LVEF is 30% on most recent TTE.     Walks with a walker.  Has moved into assisted living now.     No clinical change since last visit.     Chief Complaint: dyspnea.       Past Medical History:   Diagnosis Date   • Atrial fibrillation (CMS-HCC)    • Hypertension      History reviewed. No pertinent surgical history.  Family History   Problem Relation Age of Onset   • Heart Disease Mother      History   Smoking Status   • Never Smoker   Smokeless Tobacco   • Never Used     Allergies   Allergen Reactions   • Penicillins      Outpatient Encounter Prescriptions as of 2/8/2018   Medication Sig Dispense Refill   • metoprolol SR (TOPROL XL) 50 MG TABLET SR 24 HR Take 1 Tab by mouth every day. 90 Tab 3   • aspirin EC (ECOTRIN) 325 MG Tablet Delayed Response   0   • lisinopril (PRINIVIL) 5 MG Tab Take 1 Tab by mouth every day. 90 Tab 3   • DIGITEK 250 MCG Tab Take 250 mcg by mouth.  0   • esomeprazole (NEXIUM) 40 MG delayed-release capsule   0   • fluticasone (FLONASE) 50 MCG/ACT nasal spray   0   • furosemide (LASIX) 80 MG Tab Take 1 Tab by mouth every day.  0   • potassium chloride (KAYCIEL) 20 MEQ/15ML (10%) Solution   0   • tamsulosin (FLOMAX) 0.4 MG capsule   0   • gemfibrozil (LOPID) 600 MG Tab TAKE 1 TABLET BY MOUTH TWICE A DAY  0   • finasteride (PROSCAR) 5 MG Tab   0   • azithromycin (ZITHROMAX) 250 MG Tab take 2 tablets by mouth today then take 1 tablet DAILY FOR 4 DAYS  0   • Azelastine HCl 0.15 % Solution Spray 1  "Spray in nose 2 Times a Day. Each Nostril  0   • PROAIR  (90 BASE) MCG/ACT Aero Soln inhalation aerosol inhale 2 puffs by mouth every 4 hours  0   • [DISCONTINUED] metoprolol SR (TOPROL XL) 100 MG TABLET SR 24 HR Take 1 Tab by mouth every day. 30 Tab 11     No facility-administered encounter medications on file as of 2/8/2018.      Review of Systems   Constitutional: Negative for diaphoresis and fever.   HENT: Negative for nosebleeds.    Eyes: Negative for blurred vision and double vision.   Respiratory: Positive for shortness of breath. Negative for cough.    Cardiovascular: Negative for chest pain and palpitations.   Gastrointestinal: Negative for abdominal pain.   Genitourinary: Negative for dysuria and frequency.   Musculoskeletal: Negative for falls and myalgias.   Skin: Negative for rash.   Neurological: Negative for dizziness, sensory change and headaches.   Endo/Heme/Allergies: Does not bruise/bleed easily.   Psychiatric/Behavioral: Negative for depression and memory loss.        Objective:   /82   Pulse 65   Ht 1.88 m (6' 2\")   Wt 95.3 kg (210 lb)   SpO2 98%   BMI 26.96 kg/m²     Physical Exam   Constitutional: He is oriented to person, place, and time. No distress.   HENT:   Head: Normocephalic and atraumatic.   Eyes: Right eye exhibits no discharge. Left eye exhibits no discharge.   Neck: No JVD present.   Cardiovascular: Exam reveals no friction rub.    No murmur heard.  There is presence of an irregularly irregular heartbeats.     Pulmonary/Chest: No respiratory distress.   Abdominal: He exhibits no distension. There is no tenderness.   Musculoskeletal: He exhibits no edema or tenderness.   Neurological: He is alert and oriented to person, place, and time.   Skin: Skin is warm and dry.   Psychiatric: He has a normal mood and affect.   Nursing note and vitals reviewed.      Assessment:     1. ACC/AHA stage C systolic heart failure (CMS-HCC)  metoprolol SR (TOPROL XL) 50 MG TABLET SR 24 " HR   2. Heart failure, NYHA class 3 (CMS-HCC)  metoprolol SR (TOPROL XL) 50 MG TABLET SR 24 HR   3. Chronic atrial fibrillation (CMS-HCC)  metoprolol SR (TOPROL XL) 50 MG TABLET SR 24 HR   4. HTN (hypertension), malignant  metoprolol SR (TOPROL XL) 50 MG TABLET SR 24 HR   5. High risk medication use         Medical Decision Making:  Today's Assessment / Status / Plan:   Today, based on physical examination findings, patient is euvolemic. No JVD, lungs are clear to auscultation, no pitting edema in bilateral lower extremities, no ascites.    Dry weight is 210 lbs.    Continue Toprol 50 mg po daily, Lisinopril 5 mg po daily, lasix 80 mg po daily.    Not good candidate for Spironolactone at this time. Will re-evaluate in the future.  Not good candidate for anticoagulation due to fall risk.    POLST filled out last visit.    Will continue to closely monitor for side effects of patient's high risk medication(s) including liver, renal function and electrolytes.    I will see patient back in our Heart Failure Clinic with lab tests and studies results in 12 weeks.    I thank you for referring patient to our Heart Failure Clinic today.

## 2018-02-16 ENCOUNTER — TELEPHONE (OUTPATIENT)
Dept: CARDIOLOGY | Facility: MEDICAL CENTER | Age: 83
End: 2018-02-16

## 2018-02-16 NOTE — TELEPHONE ENCOUNTER
Hospital needs lab order faxed to them   Received: Today   Message Contents   Delisa Dash R.N.             HARIKA/Judith Jacobs at [facility] at [phone] called. She needs a lab order faxed to her and her fax number is [phone].      Faxed with receipt confirmation.

## 2018-02-22 ENCOUNTER — HOSPITAL ENCOUNTER (INPATIENT)
Dept: HOSPITAL 8 - 5SO | Age: 83
LOS: 3 days | Discharge: HOME | DRG: 292 | End: 2018-02-25
Attending: HOSPITALIST | Admitting: HOSPITALIST
Payer: MEDICARE

## 2018-02-22 VITALS — DIASTOLIC BLOOD PRESSURE: 87 MMHG | SYSTOLIC BLOOD PRESSURE: 145 MMHG

## 2018-02-22 VITALS — HEIGHT: 74 IN | BODY MASS INDEX: 24.39 KG/M2 | WEIGHT: 190.04 LBS

## 2018-02-22 VITALS — DIASTOLIC BLOOD PRESSURE: 101 MMHG | SYSTOLIC BLOOD PRESSURE: 152 MMHG

## 2018-02-22 VITALS — SYSTOLIC BLOOD PRESSURE: 163 MMHG | DIASTOLIC BLOOD PRESSURE: 99 MMHG

## 2018-02-22 DIAGNOSIS — E83.42: ICD-10-CM

## 2018-02-22 DIAGNOSIS — Z85.72: ICD-10-CM

## 2018-02-22 DIAGNOSIS — I11.0: Primary | ICD-10-CM

## 2018-02-22 DIAGNOSIS — I48.91: ICD-10-CM

## 2018-02-22 DIAGNOSIS — Z87.891: ICD-10-CM

## 2018-02-22 DIAGNOSIS — E78.5: ICD-10-CM

## 2018-02-22 DIAGNOSIS — Z80.0: ICD-10-CM

## 2018-02-22 DIAGNOSIS — N40.0: ICD-10-CM

## 2018-02-22 DIAGNOSIS — Z88.0: ICD-10-CM

## 2018-02-22 DIAGNOSIS — M19.90: ICD-10-CM

## 2018-02-22 DIAGNOSIS — K21.9: ICD-10-CM

## 2018-02-22 DIAGNOSIS — E87.6: ICD-10-CM

## 2018-02-22 DIAGNOSIS — D68.69: ICD-10-CM

## 2018-02-22 DIAGNOSIS — Z79.899: ICD-10-CM

## 2018-02-22 DIAGNOSIS — Z96.653: ICD-10-CM

## 2018-02-22 DIAGNOSIS — I50.23: ICD-10-CM

## 2018-02-22 DIAGNOSIS — Z92.3: ICD-10-CM

## 2018-02-22 DIAGNOSIS — Z79.82: ICD-10-CM

## 2018-02-22 DIAGNOSIS — R09.02: ICD-10-CM

## 2018-02-22 DIAGNOSIS — Z87.442: ICD-10-CM

## 2018-02-22 DIAGNOSIS — I25.10: ICD-10-CM

## 2018-02-22 DIAGNOSIS — Z92.21: ICD-10-CM

## 2018-02-22 LAB
<PLATELET ESTIMATE>: ADEQUATE
ANION GAP SERPL CALC-SCNC: 9 MMOL/L (ref 5–15)
ANISOCYTOSIS BLD QL SMEAR: (no result)
CALCIUM SERPL-MCNC: 8.8 MG/DL (ref 8.5–10.1)
CHLORIDE SERPL-SCNC: 102 MMOL/L (ref 98–107)
CREAT SERPL-MCNC: 0.9 MG/DL (ref 0.7–1.3)
ERYTHROCYTE [DISTWIDTH] IN BLOOD BY AUTOMATED COUNT: 17.7 % (ref 9.4–14.8)
LG PLATELETS BLD QL SMEAR: (no result)
LYMPH#(MANUAL): 0.69 X10^3/UL (ref 1–3.4)
LYMPHS% (MANUAL): 10 % (ref 22–44)
MCH RBC QN AUTO: 29.6 PG (ref 27.5–34.5)
MCHC RBC AUTO-ENTMCNC: 33.3 G/DL (ref 33.2–36.2)
MCV RBC AUTO: 88.9 FL (ref 81–97)
MD: YES
MONOS#(MANUAL): 0.41 X10^3/UL (ref 0.3–2.7)
MONOS% (MANUAL): 6 % (ref 2–9)
PLATELET # BLD AUTO: 239 X10^3/UL (ref 130–400)
PMV BLD AUTO: 9.8 FL (ref 7.4–10.4)
POLYCHROMASIA BLD QL SMEAR: (no result)
RBC # BLD AUTO: 4.82 X10^6/UL (ref 4.38–5.82)
SEG#(MANUAL): 5.8 X10^3/UL (ref 1.8–6.8)
SEGS% (MANUAL): 84 % (ref 42–75)
TROPONIN I SERPL-MCNC: 0.08 NG/ML (ref 0–0.04)
TROPONIN I SERPL-MCNC: 0.09 NG/ML (ref 0–0.04)
TSH SERPL-ACNC: 3.4 MIU/L (ref 0.36–3.74)

## 2018-02-22 PROCEDURE — 80048 BASIC METABOLIC PNL TOTAL CA: CPT

## 2018-02-22 PROCEDURE — 84443 ASSAY THYROID STIM HORMONE: CPT

## 2018-02-22 PROCEDURE — 82040 ASSAY OF SERUM ALBUMIN: CPT

## 2018-02-22 PROCEDURE — 83735 ASSAY OF MAGNESIUM: CPT

## 2018-02-22 PROCEDURE — 80053 COMPREHEN METABOLIC PANEL: CPT

## 2018-02-22 PROCEDURE — 93306 TTE W/DOPPLER COMPLETE: CPT

## 2018-02-22 PROCEDURE — 80162 ASSAY OF DIGOXIN TOTAL: CPT

## 2018-02-22 PROCEDURE — 93005 ELECTROCARDIOGRAM TRACING: CPT

## 2018-02-22 PROCEDURE — 84484 ASSAY OF TROPONIN QUANT: CPT

## 2018-02-22 PROCEDURE — 85025 COMPLETE CBC W/AUTO DIFF WBC: CPT

## 2018-02-22 PROCEDURE — 80061 LIPID PANEL: CPT

## 2018-02-22 PROCEDURE — 84100 ASSAY OF PHOSPHORUS: CPT

## 2018-02-22 PROCEDURE — 36415 COLL VENOUS BLD VENIPUNCTURE: CPT

## 2018-02-22 RX ADMIN — POTASSIUM CHLORIDE SCH MEQ: 750 TABLET, FILM COATED, EXTENDED RELEASE ORAL at 20:53

## 2018-02-22 RX ADMIN — FLUTICASONE PROPIONATE SCH SPRAY: 50 SPRAY, METERED NASAL at 21:50

## 2018-02-22 RX ADMIN — ENOXAPARIN SODIUM SCH MG: 40 INJECTION SUBCUTANEOUS at 20:56

## 2018-02-22 RX ADMIN — CARVEDILOL SCH MG: 3.12 TABLET, FILM COATED ORAL at 18:19

## 2018-02-22 RX ADMIN — FUROSEMIDE SCH MG: 10 INJECTION, SOLUTION INTRAMUSCULAR; INTRAVENOUS at 18:19

## 2018-02-22 RX ADMIN — GEMFIBROZIL SCH MG: 600 TABLET, FILM COATED ORAL at 20:53

## 2018-02-23 VITALS — SYSTOLIC BLOOD PRESSURE: 112 MMHG | DIASTOLIC BLOOD PRESSURE: 66 MMHG

## 2018-02-23 VITALS — SYSTOLIC BLOOD PRESSURE: 133 MMHG | DIASTOLIC BLOOD PRESSURE: 85 MMHG

## 2018-02-23 VITALS — DIASTOLIC BLOOD PRESSURE: 84 MMHG | SYSTOLIC BLOOD PRESSURE: 127 MMHG

## 2018-02-23 VITALS — SYSTOLIC BLOOD PRESSURE: 158 MMHG | DIASTOLIC BLOOD PRESSURE: 83 MMHG

## 2018-02-23 LAB
ANION GAP SERPL CALC-SCNC: 9 MMOL/L (ref 5–15)
BASOPHILS # BLD AUTO: 0.01 X10^3/UL (ref 0–0.1)
BASOPHILS NFR BLD AUTO: 0 % (ref 0–1)
CALCIUM SERPL-MCNC: 8.6 MG/DL (ref 8.5–10.1)
CHLORIDE SERPL-SCNC: 102 MMOL/L (ref 98–107)
CHOL/HDL RATIO: 4.1
CREAT SERPL-MCNC: 0.83 MG/DL (ref 0.7–1.3)
EOSINOPHIL # BLD AUTO: 0.01 X10^3/UL (ref 0–0.4)
EOSINOPHIL NFR BLD AUTO: 0 % (ref 1–7)
ERYTHROCYTE [DISTWIDTH] IN BLOOD BY AUTOMATED COUNT: 17.4 % (ref 9.4–14.8)
HDL CHOL %: 24 % (ref 26–37)
HDL CHOLESTEROL (DIRECT): 29 MG/DL (ref 40–60)
LDL CHOLESTEROL,CALCULATED: 59 MG/DL (ref 54–169)
LDLC/HDLC SERPL: 2 {RATIO} (ref 0.5–3)
LYMPHOCYTES # BLD AUTO: 0.62 X10^3/UL (ref 1–3.4)
LYMPHOCYTES NFR BLD AUTO: 8 % (ref 22–44)
MCH RBC QN AUTO: 28.6 PG (ref 27.5–34.5)
MCHC RBC AUTO-ENTMCNC: 32.2 G/DL (ref 33.2–36.2)
MCV RBC AUTO: 88.6 FL (ref 81–97)
MD: NO
MONOCYTES # BLD AUTO: 0.78 X10^3/UL (ref 0.2–0.8)
MONOCYTES NFR BLD AUTO: 10 % (ref 2–9)
NEUTROPHILS # BLD AUTO: 6.2 X10^3/UL (ref 1.8–6.8)
NEUTROPHILS NFR BLD AUTO: 81 % (ref 42–75)
PLATELET # BLD AUTO: 217 X10^3/UL (ref 130–400)
PMV BLD AUTO: 9.2 FL (ref 7.4–10.4)
RBC # BLD AUTO: 4.65 X10^6/UL (ref 4.38–5.82)
TRIGL SERPL-MCNC: 162 MG/DL (ref 50–200)
TROPONIN I SERPL-MCNC: 0.08 NG/ML (ref 0–0.04)
VLDLC SERPL CALC-MCNC: 32 MG/DL (ref 0–25)

## 2018-02-23 RX ADMIN — POTASSIUM CHLORIDE SCH MEQ: 20 TABLET, EXTENDED RELEASE ORAL at 17:20

## 2018-02-23 RX ADMIN — GEMFIBROZIL SCH MG: 600 TABLET, FILM COATED ORAL at 21:05

## 2018-02-23 RX ADMIN — POTASSIUM CHLORIDE SCH MEQ: 20 TABLET, EXTENDED RELEASE ORAL at 13:53

## 2018-02-23 RX ADMIN — FINASTERIDE SCH MG: 5 TABLET, FILM COATED ORAL at 10:15

## 2018-02-23 RX ADMIN — TAMSULOSIN HYDROCHLORIDE SCH MG: 0.4 CAPSULE ORAL at 10:14

## 2018-02-23 RX ADMIN — OXYCODONE HYDROCHLORIDE PRN MG: 5 TABLET ORAL at 10:14

## 2018-02-23 RX ADMIN — GEMFIBROZIL SCH MG: 600 TABLET, FILM COATED ORAL at 10:15

## 2018-02-23 RX ADMIN — CARVEDILOL SCH MG: 3.12 TABLET, FILM COATED ORAL at 06:25

## 2018-02-23 RX ADMIN — FUROSEMIDE SCH MG: 10 INJECTION, SOLUTION INTRAMUSCULAR; INTRAVENOUS at 17:20

## 2018-02-23 RX ADMIN — POTASSIUM CHLORIDE SCH MEQ: 750 TABLET, FILM COATED, EXTENDED RELEASE ORAL at 10:14

## 2018-02-23 RX ADMIN — Medication SCH MG: at 10:16

## 2018-02-23 RX ADMIN — POTASSIUM CHLORIDE SCH MEQ: 20 TABLET, EXTENDED RELEASE ORAL at 10:14

## 2018-02-23 RX ADMIN — ENOXAPARIN SODIUM SCH MG: 40 INJECTION SUBCUTANEOUS at 21:06

## 2018-02-23 RX ADMIN — FLUTICASONE PROPIONATE SCH SPRAY: 50 SPRAY, METERED NASAL at 21:06

## 2018-02-23 RX ADMIN — CARVEDILOL SCH MG: 6.25 TABLET, FILM COATED ORAL at 17:20

## 2018-02-23 RX ADMIN — FUROSEMIDE SCH MG: 10 INJECTION, SOLUTION INTRAMUSCULAR; INTRAVENOUS at 10:16

## 2018-02-23 RX ADMIN — ASPIRIN SCH MG: 81 TABLET, COATED ORAL at 06:25

## 2018-02-23 RX ADMIN — PANTOPRAZOLE SODIUM SCH MG: 40 TABLET, DELAYED RELEASE ORAL at 10:15

## 2018-02-23 RX ADMIN — LISINOPRIL SCH MG: 10 TABLET ORAL at 10:15

## 2018-02-24 VITALS — DIASTOLIC BLOOD PRESSURE: 78 MMHG | SYSTOLIC BLOOD PRESSURE: 135 MMHG

## 2018-02-24 VITALS — DIASTOLIC BLOOD PRESSURE: 89 MMHG | SYSTOLIC BLOOD PRESSURE: 121 MMHG

## 2018-02-24 VITALS — SYSTOLIC BLOOD PRESSURE: 106 MMHG | DIASTOLIC BLOOD PRESSURE: 74 MMHG

## 2018-02-24 VITALS — DIASTOLIC BLOOD PRESSURE: 79 MMHG | SYSTOLIC BLOOD PRESSURE: 128 MMHG

## 2018-02-24 VITALS — DIASTOLIC BLOOD PRESSURE: 85 MMHG | SYSTOLIC BLOOD PRESSURE: 128 MMHG

## 2018-02-24 LAB
ALBUMIN SERPL-MCNC: 3 G/DL (ref 3.4–5)
ALP SERPL-CCNC: 77 U/L (ref 45–117)
ALT SERPL-CCNC: 12 U/L (ref 12–78)
ANION GAP SERPL CALC-SCNC: 8 MMOL/L (ref 5–15)
BILIRUB SERPL-MCNC: 0.8 MG/DL (ref 0.2–1)
CALCIUM SERPL-MCNC: 8.6 MG/DL (ref 8.5–10.1)
CHLORIDE SERPL-SCNC: 103 MMOL/L (ref 98–107)
CREAT SERPL-MCNC: 0.95 MG/DL (ref 0.7–1.3)
PROT SERPL-MCNC: 6.6 G/DL (ref 6.4–8.2)

## 2018-02-24 RX ADMIN — ASPIRIN SCH MG: 81 TABLET, COATED ORAL at 05:23

## 2018-02-24 RX ADMIN — FLUTICASONE PROPIONATE SCH SPRAY: 50 SPRAY, METERED NASAL at 20:49

## 2018-02-24 RX ADMIN — GEMFIBROZIL SCH MG: 600 TABLET, FILM COATED ORAL at 07:28

## 2018-02-24 RX ADMIN — FUROSEMIDE SCH MG: 10 INJECTION, SOLUTION INTRAMUSCULAR; INTRAVENOUS at 07:28

## 2018-02-24 RX ADMIN — PANTOPRAZOLE SODIUM SCH MG: 40 TABLET, DELAYED RELEASE ORAL at 07:27

## 2018-02-24 RX ADMIN — OXYCODONE HYDROCHLORIDE PRN MG: 5 TABLET ORAL at 07:30

## 2018-02-24 RX ADMIN — CARVEDILOL SCH MG: 6.25 TABLET, FILM COATED ORAL at 18:02

## 2018-02-24 RX ADMIN — CARVEDILOL SCH MG: 6.25 TABLET, FILM COATED ORAL at 05:23

## 2018-02-24 RX ADMIN — GEMFIBROZIL SCH MG: 600 TABLET, FILM COATED ORAL at 20:49

## 2018-02-24 RX ADMIN — Medication SCH MG: at 07:27

## 2018-02-24 RX ADMIN — LISINOPRIL SCH MG: 10 TABLET ORAL at 07:27

## 2018-02-24 RX ADMIN — ENOXAPARIN SODIUM SCH MG: 40 INJECTION SUBCUTANEOUS at 20:50

## 2018-02-24 RX ADMIN — TAMSULOSIN HYDROCHLORIDE SCH MG: 0.4 CAPSULE ORAL at 07:27

## 2018-02-24 RX ADMIN — POTASSIUM CHLORIDE SCH MEQ: 750 TABLET, FILM COATED, EXTENDED RELEASE ORAL at 07:27

## 2018-02-24 RX ADMIN — FINASTERIDE SCH MG: 5 TABLET, FILM COATED ORAL at 07:28

## 2018-02-25 VITALS — SYSTOLIC BLOOD PRESSURE: 117 MMHG | DIASTOLIC BLOOD PRESSURE: 75 MMHG

## 2018-02-25 VITALS — DIASTOLIC BLOOD PRESSURE: 65 MMHG | SYSTOLIC BLOOD PRESSURE: 101 MMHG

## 2018-02-25 LAB
ALBUMIN SERPL-MCNC: 2.8 G/DL (ref 3.4–5)
ANION GAP SERPL CALC-SCNC: 10 MMOL/L (ref 5–15)
BASOPHILS # BLD AUTO: 0.02 X10^3/UL (ref 0–0.1)
BASOPHILS NFR BLD AUTO: 0 % (ref 0–1)
CALCIUM SERPL-MCNC: 8.8 MG/DL (ref 8.5–10.1)
CHLORIDE SERPL-SCNC: 102 MMOL/L (ref 98–107)
CREAT SERPL-MCNC: 1.03 MG/DL (ref 0.7–1.3)
EOSINOPHIL # BLD AUTO: 0.03 X10^3/UL (ref 0–0.4)
EOSINOPHIL NFR BLD AUTO: 0 % (ref 1–7)
ERYTHROCYTE [DISTWIDTH] IN BLOOD BY AUTOMATED COUNT: 18.2 % (ref 9.4–14.8)
LYMPHOCYTES # BLD AUTO: 0.79 X10^3/UL (ref 1–3.4)
LYMPHOCYTES NFR BLD AUTO: 10 % (ref 22–44)
MCH RBC QN AUTO: 28.9 PG (ref 27.5–34.5)
MCHC RBC AUTO-ENTMCNC: 32 G/DL (ref 33.2–36.2)
MCV RBC AUTO: 90.2 FL (ref 81–97)
MD: NO
MONOCYTES # BLD AUTO: 0.93 X10^3/UL (ref 0.2–0.8)
MONOCYTES NFR BLD AUTO: 12 % (ref 2–9)
NEUTROPHILS # BLD AUTO: 6.3 X10^3/UL (ref 1.8–6.8)
NEUTROPHILS NFR BLD AUTO: 78 % (ref 42–75)
PLATELET # BLD AUTO: 226 X10^3/UL (ref 130–400)
PMV BLD AUTO: 9.5 FL (ref 7.4–10.4)
RBC # BLD AUTO: 4.72 X10^6/UL (ref 4.38–5.82)

## 2018-02-25 RX ADMIN — ASPIRIN SCH MG: 81 TABLET, COATED ORAL at 06:09

## 2018-02-25 RX ADMIN — GEMFIBROZIL SCH MG: 600 TABLET, FILM COATED ORAL at 09:59

## 2018-02-25 RX ADMIN — TAMSULOSIN HYDROCHLORIDE SCH MG: 0.4 CAPSULE ORAL at 09:59

## 2018-02-25 RX ADMIN — POTASSIUM CHLORIDE SCH MEQ: 750 TABLET, FILM COATED, EXTENDED RELEASE ORAL at 09:59

## 2018-02-25 RX ADMIN — CARVEDILOL SCH MG: 6.25 TABLET, FILM COATED ORAL at 06:09

## 2018-02-25 RX ADMIN — Medication SCH MG: at 10:00

## 2018-02-25 RX ADMIN — FINASTERIDE SCH MG: 5 TABLET, FILM COATED ORAL at 09:00

## 2018-02-25 RX ADMIN — LISINOPRIL SCH MG: 10 TABLET ORAL at 09:59

## 2018-02-25 RX ADMIN — PANTOPRAZOLE SODIUM SCH MG: 40 TABLET, DELAYED RELEASE ORAL at 08:23

## 2018-02-27 ENCOUNTER — TELEPHONE (OUTPATIENT)
Dept: CARDIOLOGY | Facility: MEDICAL CENTER | Age: 83
End: 2018-02-27

## 2018-04-05 ENCOUNTER — OFFICE VISIT (OUTPATIENT)
Dept: CARDIOLOGY | Facility: CLINIC | Age: 83
End: 2018-04-05
Payer: MEDICARE

## 2018-04-05 VITALS
BODY MASS INDEX: 25.41 KG/M2 | DIASTOLIC BLOOD PRESSURE: 70 MMHG | SYSTOLIC BLOOD PRESSURE: 146 MMHG | WEIGHT: 198 LBS | HEIGHT: 74 IN | HEART RATE: 64 BPM

## 2018-04-05 DIAGNOSIS — I50.9 HEART FAILURE, NYHA CLASS 3 (HCC): ICD-10-CM

## 2018-04-05 DIAGNOSIS — I10 HTN (HYPERTENSION), MALIGNANT: ICD-10-CM

## 2018-04-05 DIAGNOSIS — I50.20 ACC/AHA STAGE C SYSTOLIC HEART FAILURE (HCC): ICD-10-CM

## 2018-04-05 DIAGNOSIS — I48.20 CHRONIC ATRIAL FIBRILLATION (HCC): ICD-10-CM

## 2018-04-05 DIAGNOSIS — Z79.899 HIGH RISK MEDICATION USE: ICD-10-CM

## 2018-04-05 PROCEDURE — 99214 OFFICE O/P EST MOD 30 MIN: CPT | Performed by: INTERNAL MEDICINE

## 2018-04-05 RX ORDER — LORATADINE 10 MG/1
10 TABLET ORAL DAILY
COMMUNITY

## 2018-04-05 RX ORDER — CALCIUM CARBONATE 300MG(750)
TABLET,CHEWABLE ORAL
COMMUNITY

## 2018-04-05 RX ORDER — LISINOPRIL 10 MG/1
5 TABLET ORAL 2 TIMES DAILY
COMMUNITY

## 2018-04-05 RX ORDER — CELECOXIB 200 MG/1
200 CAPSULE ORAL
COMMUNITY

## 2018-04-05 RX ORDER — CARVEDILOL 3.12 MG/1
6.25 TABLET ORAL 2 TIMES DAILY WITH MEALS
COMMUNITY

## 2018-04-05 RX ORDER — POTASSIUM CHLORIDE 20 MEQ/1
20 TABLET, EXTENDED RELEASE ORAL DAILY
COMMUNITY

## 2018-04-05 ASSESSMENT — ENCOUNTER SYMPTOMS
HEADACHES: 0
FALLS: 0
BLURRED VISION: 0
BRUISES/BLEEDS EASILY: 0
MEMORY LOSS: 0
PALPITATIONS: 0
SENSORY CHANGE: 0
MYALGIAS: 0
DEPRESSION: 0
SHORTNESS OF BREATH: 1
DIZZINESS: 0
DIAPHORESIS: 0
FEVER: 0
ABDOMINAL PAIN: 0
DOUBLE VISION: 0
COUGH: 0

## 2018-04-05 NOTE — PROGRESS NOTES
Chief Complaint   Patient presents with   • Congestive Heart Failure       Subjective:   Bro Gutierrez is a 82 y.o. male who presents today for cardiac evaluation and care after his prior hospitalization due to HF exacerbation in 12/2016 and again in 12/2017. Patient was seen in the hospital and found to have atrial fibirilllation with rapid ventricular rate. He did not take his Digoxin.      Patient still gets winded with daily living activities and exertion. No symptoms at rest.     He is taking coreg 6.25 mg po bid, Lisinopril 5 mg po daily. Lasix 80 mg po daily.      LVEF is 30% on most recent TTE.     Walks with a walker.  Has moved into assisted living now.     No clinical change since last visit.    Past Medical History:   Diagnosis Date   • Atrial fibrillation (CMS-HCC)    • Hypertension      History reviewed. No pertinent surgical history.  Family History   Problem Relation Age of Onset   • Heart Disease Mother      Social History     Social History   • Marital status: Unknown     Spouse name: N/A   • Number of children: N/A   • Years of education: N/A     Occupational History   • Not on file.     Social History Main Topics   • Smoking status: Never Smoker   • Smokeless tobacco: Never Used   • Alcohol use Not on file   • Drug use: Unknown   • Sexual activity: Not on file     Other Topics Concern   • Not on file     Social History Narrative   • No narrative on file     Allergies   Allergen Reactions   • Penicillins      Outpatient Encounter Prescriptions as of 4/5/2018   Medication Sig Dispense Refill   • lisinopril (PRINIVIL) 10 MG Tab Take 5 mg by mouth 2 times a day.     • carvedilol (COREG) 3.125 MG Tab Take 6.25 mg by mouth 2 times a day, with meals.     • celecoxib (CELEBREX) 200 MG Cap Take 200 mg by mouth. 3 TIMES PER WEEK     • loratadine (CLARITIN) 10 MG Tab Take 10 mg by mouth every day.     • Magnesium 400 MG Tab Take  by mouth.     • potassium chloride SA (KDUR) 20 MEQ Tab CR Take 20 mEq by mouth  "every day.     • aspirin EC (ECOTRIN) 325 MG Tablet Delayed Response   0   • DIGITEK 250 MCG Tab Take 250 mcg by mouth.  0   • esomeprazole (NEXIUM) 40 MG delayed-release capsule   0   • fluticasone (FLONASE) 50 MCG/ACT nasal spray   0   • furosemide (LASIX) 80 MG Tab Take 1 Tab by mouth every day.  0   • tamsulosin (FLOMAX) 0.4 MG capsule   0   • gemfibrozil (LOPID) 600 MG Tab TAKE 1 TABLET BY MOUTH TWICE A DAY  0   • finasteride (PROSCAR) 5 MG Tab   0   • Azelastine HCl 0.15 % Solution Spray 1 Spray in nose 2 Times a Day. Each Nostril  0   • PROAIR  (90 BASE) MCG/ACT Aero Soln inhalation aerosol inhale 2 puffs by mouth every 4 hours  0   • [DISCONTINUED] metoprolol SR (TOPROL XL) 50 MG TABLET SR 24 HR Take 1 Tab by mouth every day. 90 Tab 3   • lisinopril (PRINIVIL) 5 MG Tab Take 1 Tab by mouth every day. 90 Tab 3   • [DISCONTINUED] potassium chloride (KAYCIEL) 20 MEQ/15ML (10%) Solution   0   • azithromycin (ZITHROMAX) 250 MG Tab take 2 tablets by mouth today then take 1 tablet DAILY FOR 4 DAYS  0     No facility-administered encounter medications on file as of 4/5/2018.      Review of Systems   Constitutional: Negative for diaphoresis and fever.   HENT: Negative for nosebleeds.    Eyes: Negative for blurred vision and double vision.   Respiratory: Positive for shortness of breath. Negative for cough.    Cardiovascular: Negative for chest pain and palpitations.   Gastrointestinal: Negative for abdominal pain.   Genitourinary: Negative for dysuria and frequency.   Musculoskeletal: Negative for falls and myalgias.   Skin: Negative for rash.   Neurological: Negative for dizziness, sensory change and headaches.   Endo/Heme/Allergies: Does not bruise/bleed easily.   Psychiatric/Behavioral: Negative for depression and memory loss.        Objective:   /70   Pulse 64   Ht 1.88 m (6' 2\")   Wt 89.8 kg (198 lb)   BMI 25.42 kg/m²     Physical Exam   Constitutional: He is oriented to person, place, and time. " No distress.   HENT:   Head: Normocephalic and atraumatic.   Right Ear: External ear normal.   Left Ear: External ear normal.   Eyes: Right eye exhibits no discharge. Left eye exhibits no discharge.   Neck: No JVD present. No thyromegaly present.   Cardiovascular: Normal rate, regular rhythm, normal heart sounds and intact distal pulses.  Exam reveals no gallop and no friction rub.    No murmur heard.  Pulmonary/Chest: Breath sounds normal. No respiratory distress.   Abdominal: Bowel sounds are normal. He exhibits no distension. There is no tenderness.   Musculoskeletal: He exhibits no edema or tenderness.   Neurological: He is alert and oriented to person, place, and time. No cranial nerve deficit.   Skin: Skin is warm and dry. He is not diaphoretic.   Psychiatric: He has a normal mood and affect. His behavior is normal.   Nursing note and vitals reviewed.      Assessment:     1. ACC/AHA stage C systolic heart failure (CMS-HCC)     2. Heart failure, NYHA class 3 (CMS-HCC)     3. Chronic atrial fibrillation (CMS-HCC)     4. HTN (hypertension), malignant     5. High risk medication use         Medical Decision Making:  Today's Assessment / Status / Plan:   Today, based on physical examination findings, patient is euvolemic. No JVD, lungs are clear to auscultation, no pitting edema in bilateral lower extremities, no ascites.    Dry weight is 198 lbs.    At this time, patient does not want to change his medications. He does not want to increase the dosage. He is happy with the dosage at this point. I understand how he feels. We will leave everything the way they are.    In regards to having invasive procedure done, patient does not want to have anything done. Partly because he cannot find transportation to go to [place]. Partly because he does not want invasive procedure.    Therefore, we will not pursue ICD placement.    I will see patient back in clinic with lab tests and studies results in 6 months.    I thank you for  referring patient to our Cardiology Clinic today.

## 2018-04-05 NOTE — LETTER
[facility] [address] [place] [address]  Phone: [phone]  Fax: [phone]              Bro Gutierrez  1935    Encounter Date: 4/5/2018    Crys Jorge M.D.          PROGRESS NOTE:  Chief Complaint   Patient presents with   • Congestive Heart Failure       Subjective:   Bro Gutierrez is a 82 y.o. male who presents today for cardiac evaluation and care after his prior hospitalization due to HF exacerbation in 12/2016 and again in 12/2017. Patient was seen in the hospital and found to have atrial fibirilllation with rapid ventricular rate. He did not take his Digoxin.      Patient still gets winded with daily living activities and exertion. No symptoms at rest.     He is taking coreg 6.25 mg po bid, Lisinopril 5 mg po daily. Lasix 80 mg po daily.      LVEF is 30% on most recent TTE.     Walks with a walker.  Has moved into assisted living now.     No clinical change since last visit.    Past Medical History:   Diagnosis Date   • Atrial fibrillation (CMS-HCC)    • Hypertension      History reviewed. No pertinent surgical history.  Family History   Problem Relation Age of Onset   • Heart Disease Mother      Social History     Social History   • Marital status: Unknown     Spouse name: N/A   • Number of children: N/A   • Years of education: N/A     Occupational History   • Not on file.     Social History Main Topics   • Smoking status: Never Smoker   • Smokeless tobacco: Never Used   • Alcohol use Not on file   • Drug use: Unknown   • Sexual activity: Not on file     Other Topics Concern   • Not on file     Social History Narrative   • No narrative on file     Allergies   Allergen Reactions   • Penicillins      Outpatient Encounter Prescriptions as of 4/5/2018   Medication Sig Dispense Refill   • lisinopril (PRINIVIL) 10 MG Tab Take 5 mg by mouth 2 times a day.     • carvedilol (COREG) 3.125 MG Tab Take 6.25 mg by mouth 2 times a day,  with meals.     • celecoxib (CELEBREX) 200 MG Cap Take 200 mg by mouth. 3 TIMES PER WEEK     • loratadine (CLARITIN) 10 MG Tab Take 10 mg by mouth every day.     • Magnesium 400 MG Tab Take  by mouth.     • potassium chloride SA (KDUR) 20 MEQ Tab CR Take 20 mEq by mouth every day.     • aspirin EC (ECOTRIN) 325 MG Tablet Delayed Response   0   • DIGITEK 250 MCG Tab Take 250 mcg by mouth.  0   • esomeprazole (NEXIUM) 40 MG delayed-release capsule   0   • fluticasone (FLONASE) 50 MCG/ACT nasal spray   0   • furosemide (LASIX) 80 MG Tab Take 1 Tab by mouth every day.  0   • tamsulosin (FLOMAX) 0.4 MG capsule   0   • gemfibrozil (LOPID) 600 MG Tab TAKE 1 TABLET BY MOUTH TWICE A DAY  0   • finasteride (PROSCAR) 5 MG Tab   0   • Azelastine HCl 0.15 % Solution Spray 1 Spray in nose 2 Times a Day. Each Nostril  0   • PROAIR  (90 BASE) MCG/ACT Aero Soln inhalation aerosol inhale 2 puffs by mouth every 4 hours  0   • [DISCONTINUED] metoprolol SR (TOPROL XL) 50 MG TABLET SR 24 HR Take 1 Tab by mouth every day. 90 Tab 3   • lisinopril (PRINIVIL) 5 MG Tab Take 1 Tab by mouth every day. 90 Tab 3   • [DISCONTINUED] potassium chloride (KAYCIEL) 20 MEQ/15ML (10%) Solution   0   • azithromycin (ZITHROMAX) 250 MG Tab take 2 tablets by mouth today then take 1 tablet DAILY FOR 4 DAYS  0     No facility-administered encounter medications on file as of 4/5/2018.      Review of Systems   Constitutional: Negative for diaphoresis and fever.   HENT: Negative for nosebleeds.    Eyes: Negative for blurred vision and double vision.   Respiratory: Positive for shortness of breath. Negative for cough.    Cardiovascular: Negative for chest pain and palpitations.   Gastrointestinal: Negative for abdominal pain.   Genitourinary: Negative for dysuria and frequency.   Musculoskeletal: Negative for falls and myalgias.   Skin: Negative for rash.   Neurological: Negative for dizziness, sensory change and headaches.   Endo/Heme/Allergies: Does not  "bruise/bleed easily.   Psychiatric/Behavioral: Negative for depression and memory loss.        Objective:   /70   Pulse 64   Ht 1.88 m (6' 2\")   Wt 89.8 kg (198 lb)   BMI 25.42 kg/m²      Physical Exam   Constitutional: He is oriented to person, place, and time. No distress.   HENT:   Head: Normocephalic and atraumatic.   Right Ear: External ear normal.   Left Ear: External ear normal.   Eyes: Right eye exhibits no discharge. Left eye exhibits no discharge.   Neck: No JVD present. No thyromegaly present.   Cardiovascular: Normal rate, regular rhythm, normal heart sounds and intact distal pulses.  Exam reveals no gallop and no friction rub.    No murmur heard.  Pulmonary/Chest: Breath sounds normal. No respiratory distress.   Abdominal: Bowel sounds are normal. He exhibits no distension. There is no tenderness.   Musculoskeletal: He exhibits no edema or tenderness.   Neurological: He is alert and oriented to person, place, and time. No cranial nerve deficit.   Skin: Skin is warm and dry. He is not diaphoretic.   Psychiatric: He has a normal mood and affect. His behavior is normal.   Nursing note and vitals reviewed.      Assessment:     1. ACC/AHA stage C systolic heart failure (CMS-HCC)     2. Heart failure, NYHA class 3 (CMS-HCC)     3. Chronic atrial fibrillation (CMS-HCC)     4. HTN (hypertension), malignant     5. High risk medication use         Medical Decision Making:  Today's Assessment / Status / Plan:   Today, based on physical examination findings, patient is euvolemic. No JVD, lungs are clear to auscultation, no pitting edema in bilateral lower extremities, no ascites.    Dry weight is 198 lbs.    At this time, patient does not want to change his medications. He does not want to increase the dosage. He is happy with the dosage at this point. I understand how he feels. We will leave everything the way they are.    In regards to having invasive procedure done, patient does not want to have " anything done. Partly because he cannot find transportation to go to [place]. Partly because he does not want invasive procedure.    Therefore, we will not pursue ICD placement.    I will see patient back in clinic with lab tests and studies results in 6 months.    I thank you for referring patient to our Cardiology Clinic today.            Alfredo Peña M.D.  [address] [place] [address]  VIA Facsimile: [phone]

## 2018-08-09 ENCOUNTER — OFFICE VISIT (OUTPATIENT)
Dept: CARDIOLOGY | Facility: CLINIC | Age: 83
End: 2018-08-09
Payer: MEDICARE

## 2018-08-09 VITALS
BODY MASS INDEX: 26.31 KG/M2 | OXYGEN SATURATION: 96 % | DIASTOLIC BLOOD PRESSURE: 68 MMHG | HEART RATE: 64 BPM | HEIGHT: 74 IN | SYSTOLIC BLOOD PRESSURE: 105 MMHG | WEIGHT: 205 LBS

## 2018-08-09 DIAGNOSIS — Z79.899 HIGH RISK MEDICATION USE: ICD-10-CM

## 2018-08-09 DIAGNOSIS — I50.9 HEART FAILURE, NYHA CLASS 3 (HCC): ICD-10-CM

## 2018-08-09 DIAGNOSIS — I50.20 ACC/AHA STAGE C SYSTOLIC HEART FAILURE (HCC): ICD-10-CM

## 2018-08-09 DIAGNOSIS — I10 HTN (HYPERTENSION), MALIGNANT: ICD-10-CM

## 2018-08-09 DIAGNOSIS — I48.20 CHRONIC ATRIAL FIBRILLATION (HCC): ICD-10-CM

## 2018-08-09 DIAGNOSIS — R06.09 DYSPNEA ON EXERTION: ICD-10-CM

## 2018-08-09 PROCEDURE — 99497 ADVNCD CARE PLAN 30 MIN: CPT | Performed by: INTERNAL MEDICINE

## 2018-08-09 PROCEDURE — 99214 OFFICE O/P EST MOD 30 MIN: CPT | Mod: 25 | Performed by: INTERNAL MEDICINE

## 2018-08-09 ASSESSMENT — ENCOUNTER SYMPTOMS
HEADACHES: 0
DIZZINESS: 0
SENSORY CHANGE: 0
PALPITATIONS: 0
FEVER: 0
BRUISES/BLEEDS EASILY: 0
DIAPHORESIS: 0
COUGH: 0
MYALGIAS: 0
DOUBLE VISION: 0
MEMORY LOSS: 0
DEPRESSION: 0
FALLS: 0
SHORTNESS OF BREATH: 1
BLURRED VISION: 0
ABDOMINAL PAIN: 0

## 2018-08-09 NOTE — PROGRESS NOTES
Chief Complaint   Patient presents with   • CHF (Chronic)       Subjective:   Bro Gutierrez is a 82 y.o. male who presents today for cardiac evaluation and care after his prior hospitalization due to HF exacerbation in 12/2016 and again in 12/2017. Patient was seen in the hospital and found to have atrial fibirilllation with rapid ventricular rate.     Patient still gets winded with daily living activities and exertion. No symptoms at rest.     He is taking  Metoprolol ER 50 mg po daily, Lisinopril 5 mg po daily. Lasix 80 mg po daily.      LVEF is 30% on most recent TTE.     Walks with a walker.  Has moved into assisted living now.     No clinical change since last visit.    Past Medical History:   Diagnosis Date   • Atrial fibrillation (HCC)    • Hypertension      History reviewed. No pertinent surgical history.  Family History   Problem Relation Age of Onset   • Heart Disease Mother      Social History     Social History   • Marital status: Unknown     Spouse name: N/A   • Number of children: N/A   • Years of education: N/A     Occupational History   • Not on file.     Social History Main Topics   • Smoking status: Never Smoker   • Smokeless tobacco: Never Used   • Alcohol use Not on file   • Drug use: Unknown   • Sexual activity: Not on file     Other Topics Concern   • Not on file     Social History Narrative   • No narrative on file     Allergies   Allergen Reactions   • Penicillins      Outpatient Encounter Prescriptions as of 8/9/2018   Medication Sig Dispense Refill   • lisinopril (PRINIVIL) 10 MG Tab Take 5 mg by mouth 2 times a day.     • carvedilol (COREG) 3.125 MG Tab Take 6.25 mg by mouth 2 times a day, with meals.     • celecoxib (CELEBREX) 200 MG Cap Take 200 mg by mouth. 3 TIMES PER WEEK     • loratadine (CLARITIN) 10 MG Tab Take 10 mg by mouth every day.     • Magnesium 400 MG Tab Take  by mouth.     • potassium chloride SA (KDUR) 20 MEQ Tab CR Take 20 mEq by mouth every day.     • aspirin EC  "(ECOTRIN) 325 MG Tablet Delayed Response   0   • lisinopril (PRINIVIL) 5 MG Tab Take 1 Tab by mouth every day. 90 Tab 3   • DIGITEK 250 MCG Tab Take 250 mcg by mouth.  0   • esomeprazole (NEXIUM) 40 MG delayed-release capsule   0   • fluticasone (FLONASE) 50 MCG/ACT nasal spray   0   • furosemide (LASIX) 80 MG Tab Take 1 Tab by mouth every day.  0   • tamsulosin (FLOMAX) 0.4 MG capsule   0   • gemfibrozil (LOPID) 600 MG Tab TAKE 1 TABLET BY MOUTH TWICE A DAY  0   • finasteride (PROSCAR) 5 MG Tab   0   • azithromycin (ZITHROMAX) 250 MG Tab take 2 tablets by mouth today then take 1 tablet DAILY FOR 4 DAYS  0   • Azelastine HCl 0.15 % Solution Spray 1 Spray in nose 2 Times a Day. Each Nostril  0   • PROAIR  (90 BASE) MCG/ACT Aero Soln inhalation aerosol inhale 2 puffs by mouth every 4 hours  0     No facility-administered encounter medications on file as of 8/9/2018.      Review of Systems   Constitutional: Negative for diaphoresis and fever.   HENT: Negative for nosebleeds.    Eyes: Negative for blurred vision and double vision.   Respiratory: Positive for shortness of breath. Negative for cough.    Cardiovascular: Negative for chest pain and palpitations.   Gastrointestinal: Negative for abdominal pain.   Genitourinary: Negative for dysuria and frequency.   Musculoskeletal: Negative for falls and myalgias.   Skin: Negative for rash.   Neurological: Negative for dizziness, sensory change and headaches.   Endo/Heme/Allergies: Does not bruise/bleed easily.   Psychiatric/Behavioral: Negative for depression and memory loss.        Objective:   /68   Pulse 64   Ht 1.88 m (6' 2\")   Wt 93 kg (205 lb)   SpO2 96%   BMI 26.32 kg/m²     Physical Exam   Constitutional: He is oriented to person, place, and time. No distress.   HENT:   Head: Normocephalic and atraumatic.   Right Ear: External ear normal.   Left Ear: External ear normal.   Eyes: Right eye exhibits no discharge. Left eye exhibits no discharge. "   Neck: No JVD present. No thyromegaly present.   Cardiovascular: Normal rate, normal heart sounds and intact distal pulses.  Exam reveals no gallop and no friction rub.    No murmur heard.  There is presence of an irregularly irregular heartbeats.     Pulmonary/Chest: Breath sounds normal. No respiratory distress.   Abdominal: Bowel sounds are normal. He exhibits no distension. There is no tenderness.   Musculoskeletal: He exhibits no edema or tenderness.   Neurological: He is alert and oriented to person, place, and time. No cranial nerve deficit.   Skin: Skin is warm and dry. He is not diaphoretic.   Psychiatric: He has a normal mood and affect. His behavior is normal.   Nursing note and vitals reviewed.      Assessment:     1. ACC/AHA stage C systolic heart failure (HCC)     2. Heart failure, NYHA class 3 (HCC)     3. Chronic atrial fibrillation (HCC)     4. HTN (hypertension), malignant     5. High risk medication use     6. Dyspnea on exertion         Medical Decision Making:  Today's Assessment / Status / Plan:   Today, based on physical examination findings, patient is euvolemic. No JVD, lungs are clear to auscultation, no pitting edema in bilateral lower extremities, no ascites.    Dry weight is 205 lbs.    Continue Toprol 50 mg po daily, Lisinopril 5 mg po daily, lasix 80 mg po daily.     Not good candidate for Spironolactone at this time. Will re-evaluate in the future.    Not good candidate for anticoagulation due to fall risk.     POLST filled out prior visit. DNR.    I spent 16 minutes talking to patient and family members at bedside about end-of-life issue. He does not want any invasive procedures or surgeries. He does not want ICD placement.     Will continue to closely monitor for side effects of patient's high risk medication(s) including liver, renal function and electrolytes.     I will see patient back in our Heart Failure Clinic with lab tests and studies results in 12 weeks.     I thank you  for referring patient to our Heart Failure Clinic today.

## 2018-08-09 NOTE — LETTER
[facility] Health[facility] [address] [address]  Phone: [phone]  Fax: [phone]              Bro Gutierrez  1935    Encounter Date: 8/9/2018    Crys Jorge M.D.          PROGRESS NOTE:  Chief Complaint   Patient presents with   • CHF (Chronic)       Subjective:   Bro Gutierrez is a 82 y.o. male who presents today for cardiac evaluation and care after his prior hospitalization due to HF exacerbation in 12/2016 and again in 12/2017. Patient was seen in the hospital and found to have atrial fibirilllation with rapid ventricular rate.     Patient still gets winded with daily living activities and exertion. No symptoms at rest.     He is taking  Metoprolol ER 50 mg po daily, Lisinopril 5 mg po daily. Lasix 80 mg po daily.      LVEF is 30% on most recent TTE.     Walks with a walker.  Has moved into assisted living now.     No clinical change since last visit.    Past Medical History:   Diagnosis Date   • Atrial fibrillation (HCC)    • Hypertension      History reviewed. No pertinent surgical history.  Family History   Problem Relation Age of Onset   • Heart Disease Mother      Social History     Social History   • Marital status: Unknown     Spouse name: N/A   • Number of children: N/A   • Years of education: N/A     Occupational History   • Not on file.     Social History Main Topics   • Smoking status: Never Smoker   • Smokeless tobacco: Never Used   • Alcohol use Not on file   • Drug use: Unknown   • Sexual activity: Not on file     Other Topics Concern   • Not on file     Social History Narrative   • No narrative on file     Allergies   Allergen Reactions   • Penicillins      Outpatient Encounter Prescriptions as of 8/9/2018   Medication Sig Dispense Refill   • lisinopril (PRINIVIL) 10 MG Tab Take 5 mg by mouth 2 times a day.     • carvedilol (COREG) 3.125 MG Tab Take 6.25 mg by mouth 2 times a day, with meals.     • celecoxib  "(CELEBREX) 200 MG Cap Take 200 mg by mouth. 3 TIMES PER WEEK     • loratadine (CLARITIN) 10 MG Tab Take 10 mg by mouth every day.     • Magnesium 400 MG Tab Take  by mouth.     • potassium chloride SA (KDUR) 20 MEQ Tab CR Take 20 mEq by mouth every day.     • aspirin EC (ECOTRIN) 325 MG Tablet Delayed Response   0   • lisinopril (PRINIVIL) 5 MG Tab Take 1 Tab by mouth every day. 90 Tab 3   • DIGITEK 250 MCG Tab Take 250 mcg by mouth.  0   • esomeprazole (NEXIUM) 40 MG delayed-release capsule   0   • fluticasone (FLONASE) 50 MCG/ACT nasal spray   0   • furosemide (LASIX) 80 MG Tab Take 1 Tab by mouth every day.  0   • tamsulosin (FLOMAX) 0.4 MG capsule   0   • gemfibrozil (LOPID) 600 MG Tab TAKE 1 TABLET BY MOUTH TWICE A DAY  0   • finasteride (PROSCAR) 5 MG Tab   0   • azithromycin (ZITHROMAX) 250 MG Tab take 2 tablets by mouth today then take 1 tablet DAILY FOR 4 DAYS  0   • Azelastine HCl 0.15 % Solution Spray 1 Spray in nose 2 Times a Day. Each Nostril  0   • PROAIR  (90 BASE) MCG/ACT Aero Soln inhalation aerosol inhale 2 puffs by mouth every 4 hours  0     No facility-administered encounter medications on file as of 8/9/2018.      Review of Systems   Constitutional: Negative for diaphoresis and fever.   HENT: Negative for nosebleeds.    Eyes: Negative for blurred vision and double vision.   Respiratory: Positive for shortness of breath. Negative for cough.    Cardiovascular: Negative for chest pain and palpitations.   Gastrointestinal: Negative for abdominal pain.   Genitourinary: Negative for dysuria and frequency.   Musculoskeletal: Negative for falls and myalgias.   Skin: Negative for rash.   Neurological: Negative for dizziness, sensory change and headaches.   Endo/Heme/Allergies: Does not bruise/bleed easily.   Psychiatric/Behavioral: Negative for depression and memory loss.        Objective:   /68   Pulse 64   Ht 1.88 m (6' 2\")   Wt 93 kg (205 lb)   SpO2 96%   BMI 26.32 kg/m²      "     Physical Exam   Constitutional: He is oriented to person, place, and time. No distress.   HENT:   Head: Normocephalic and atraumatic.   Right Ear: External ear normal.   Left Ear: External ear normal.   Eyes: Right eye exhibits no discharge. Left eye exhibits no discharge.   Neck: No JVD present. No thyromegaly present.   Cardiovascular: Normal rate, normal heart sounds and intact distal pulses.  Exam reveals no gallop and no friction rub.    No murmur heard.  There is presence of an irregularly irregular heartbeats.     Pulmonary/Chest: Breath sounds normal. No respiratory distress.   Abdominal: Bowel sounds are normal. He exhibits no distension. There is no tenderness.   Musculoskeletal: He exhibits no edema or tenderness.   Neurological: He is alert and oriented to person, place, and time. No cranial nerve deficit.   Skin: Skin is warm and dry. He is not diaphoretic.   Psychiatric: He has a normal mood and affect. His behavior is normal.   Nursing note and vitals reviewed.      Assessment:     1. ACC/AHA stage C systolic heart failure (HCC)     2. Heart failure, NYHA class 3 (HCC)     3. Chronic atrial fibrillation (HCC)     4. HTN (hypertension), malignant     5. High risk medication use     6. Dyspnea on exertion         Medical Decision Making:  Today's Assessment / Status / Plan:   Today, based on physical examination findings, patient is euvolemic. No JVD, lungs are clear to auscultation, no pitting edema in bilateral lower extremities, no ascites.    Dry weight is 205 lbs.    Continue Toprol 50 mg po daily, Lisinopril 5 mg po daily, lasix 80 mg po daily.     Not good candidate for Spironolactone at this time. Will re-evaluate in the future.    Not good candidate for anticoagulation due to fall risk.     POLST filled out prior visit. DNR.    I spent 16 minutes talking to patient and family members at bedside about end-of-life issue. He does not want any invasive procedures or surgeries. He does not want  ICD placement.     Will continue to closely monitor for side effects of patient's high risk medication(s) including liver, renal function and electrolytes.     I will see patient back in our Heart Failure Clinic with lab tests and studies results in 12 weeks.     I thank you for referring patient to our Heart Failure Clinic today.          Alfredo Peña M.D.  [address] [place] [address]  VIA Facsimile: [phone]

## 2018-10-18 ENCOUNTER — OFFICE VISIT (OUTPATIENT)
Dept: CARDIOLOGY | Facility: CLINIC | Age: 83
End: 2018-10-18
Payer: MEDICARE

## 2018-10-18 VITALS
DIASTOLIC BLOOD PRESSURE: 54 MMHG | OXYGEN SATURATION: 90 % | BODY MASS INDEX: 26.44 KG/M2 | SYSTOLIC BLOOD PRESSURE: 110 MMHG | WEIGHT: 206 LBS | HEIGHT: 74 IN | HEART RATE: 62 BPM

## 2018-10-18 DIAGNOSIS — Z79.899 HIGH RISK MEDICATION USE: ICD-10-CM

## 2018-10-18 DIAGNOSIS — I50.20 ACC/AHA STAGE C SYSTOLIC HEART FAILURE (HCC): ICD-10-CM

## 2018-10-18 DIAGNOSIS — I10 HTN (HYPERTENSION), MALIGNANT: ICD-10-CM

## 2018-10-18 DIAGNOSIS — I48.20 CHRONIC ATRIAL FIBRILLATION (HCC): ICD-10-CM

## 2018-10-18 DIAGNOSIS — I50.9 HEART FAILURE, NYHA CLASS 3 (HCC): ICD-10-CM

## 2018-10-18 DIAGNOSIS — R06.09 DYSPNEA ON EXERTION: ICD-10-CM

## 2018-10-18 PROCEDURE — 99214 OFFICE O/P EST MOD 30 MIN: CPT | Performed by: INTERNAL MEDICINE

## 2018-10-18 ASSESSMENT — ENCOUNTER SYMPTOMS
BLURRED VISION: 0
SPEECH CHANGE: 0
BRUISES/BLEEDS EASILY: 0
NAUSEA: 0
MYALGIAS: 0
SHORTNESS OF BREATH: 0
BLOOD IN STOOL: 0
CLAUDICATION: 0
PALPITATIONS: 0
DEPRESSION: 0
COUGH: 0
FEVER: 0
CHILLS: 0
HEADACHES: 0
FALLS: 0
LOSS OF CONSCIOUSNESS: 0
EYE DISCHARGE: 0
PND: 0
ABDOMINAL PAIN: 0
WEIGHT LOSS: 0
DIZZINESS: 0
DOUBLE VISION: 0
SENSORY CHANGE: 0
VOMITING: 0
EYE PAIN: 0
ORTHOPNEA: 0
HALLUCINATIONS: 0

## 2018-10-18 NOTE — PROGRESS NOTES
Chief Complaint   Patient presents with   • Congestive Heart Failure       Subjective:   Bro Gutierrez is a 83 y.o. male who presents today for cardiac evaluation and care after his prior hospitalization due to HF exacerbation in 12/2016 and again in 12/2017. Patient was seen in the hospital and found to have atrial fibirilllation with rapid ventricular rate.     Patient still gets winded with daily living activities and exertion. No symptoms at rest.     He is taking  Metoprolol ER 50 mg po daily, Lisinopril 5 mg po daily. Lasix 80 mg po daily.      LVEF is 30% on most recent TTE.     Walks with a walker.  Has moved into assisted living now.     No clinical change since last visit.    Past Medical History:   Diagnosis Date   • Atrial fibrillation (HCC)    • Hypertension      History reviewed. No pertinent surgical history.  Family History   Problem Relation Age of Onset   • Heart Disease Mother      Social History     Social History   • Marital status: Unknown     Spouse name: N/A   • Number of children: N/A   • Years of education: N/A     Occupational History   • Not on file.     Social History Main Topics   • Smoking status: Never Smoker   • Smokeless tobacco: Never Used   • Alcohol use Not on file   • Drug use: Unknown   • Sexual activity: Not on file     Other Topics Concern   • Not on file     Social History Narrative   • No narrative on file     Allergies   Allergen Reactions   • Penicillins      Outpatient Encounter Prescriptions as of 10/18/2018   Medication Sig Dispense Refill   • lisinopril (PRINIVIL) 10 MG Tab Take 5 mg by mouth 2 times a day.     • carvedilol (COREG) 3.125 MG Tab Take 6.25 mg by mouth 2 times a day, with meals.     • celecoxib (CELEBREX) 200 MG Cap Take 200 mg by mouth. 3 TIMES PER WEEK     • loratadine (CLARITIN) 10 MG Tab Take 10 mg by mouth every day.     • Magnesium 400 MG Tab Take  by mouth.     • potassium chloride SA (KDUR) 20 MEQ Tab CR Take 20 mEq by mouth every day.     •  aspirin EC (ECOTRIN) 325 MG Tablet Delayed Response   0   • lisinopril (PRINIVIL) 5 MG Tab Take 1 Tab by mouth every day. 90 Tab 3   • DIGITEK 250 MCG Tab Take 250 mcg by mouth.  0   • esomeprazole (NEXIUM) 40 MG delayed-release capsule   0   • fluticasone (FLONASE) 50 MCG/ACT nasal spray   0   • furosemide (LASIX) 80 MG Tab Take 1 Tab by mouth every day.  0   • tamsulosin (FLOMAX) 0.4 MG capsule   0   • gemfibrozil (LOPID) 600 MG Tab TAKE 1 TABLET BY MOUTH TWICE A DAY  0   • finasteride (PROSCAR) 5 MG Tab   0   • azithromycin (ZITHROMAX) 250 MG Tab take 2 tablets by mouth today then take 1 tablet DAILY FOR 4 DAYS  0   • Azelastine HCl 0.15 % Solution Spray 1 Spray in nose 2 Times a Day. Each Nostril  0   • PROAIR  (90 BASE) MCG/ACT Aero Soln inhalation aerosol inhale 2 puffs by mouth every 4 hours  0     No facility-administered encounter medications on file as of 10/18/2018.      Review of Systems   Constitutional: Negative for chills, fever, malaise/fatigue and weight loss.   HENT: Negative for ear discharge, ear pain, hearing loss and nosebleeds.    Eyes: Negative for blurred vision, double vision, pain and discharge.   Respiratory: Negative for cough and shortness of breath.    Cardiovascular: Negative for chest pain, palpitations, orthopnea, claudication, leg swelling and PND.   Gastrointestinal: Negative for abdominal pain, blood in stool, melena, nausea and vomiting.   Genitourinary: Negative for dysuria and hematuria.   Musculoskeletal: Negative for falls, joint pain and myalgias.   Skin: Negative for itching and rash.   Neurological: Negative for dizziness, sensory change, speech change, loss of consciousness and headaches.   Endo/Heme/Allergies: Negative for environmental allergies. Does not bruise/bleed easily.   Psychiatric/Behavioral: Negative for depression, hallucinations and suicidal ideas.        Objective:   /54 (BP Location: Left arm, Patient Position: Sitting, BP Cuff Size: Adult)   " Pulse 62   Ht 1.88 m (6' 2\")   Wt 93.4 kg (206 lb)   SpO2 90%   BMI 26.45 kg/m²     Physical Exam   Constitutional: He is oriented to person, place, and time. No distress.   HENT:   Head: Normocephalic and atraumatic.   Right Ear: External ear normal.   Left Ear: External ear normal.   Eyes: Right eye exhibits no discharge. Left eye exhibits no discharge.   Neck: No JVD present. No thyromegaly present.   Cardiovascular: Normal rate, regular rhythm, normal heart sounds and intact distal pulses.  Exam reveals no gallop and no friction rub.    No murmur heard.  Pulmonary/Chest: Breath sounds normal. No respiratory distress.   Abdominal: Bowel sounds are normal. He exhibits no distension. There is no tenderness.   Musculoskeletal: He exhibits no edema or tenderness.   Neurological: He is alert and oriented to person, place, and time. No cranial nerve deficit.   Skin: Skin is warm and dry. He is not diaphoretic.   Psychiatric: He has a normal mood and affect. His behavior is normal.   Nursing note and vitals reviewed.      Assessment:     1. ACC/AHA stage C systolic heart failure (HCC)     2. Heart failure, NYHA class 3 (HCC)     3. Chronic atrial fibrillation (HCC)     4. HTN (hypertension), malignant     5. Dyspnea on exertion     6. High risk medication use         Medical Decision Making:  Today's Assessment / Status / Plan:   Today, based on physical examination findings, patient is euvolemic. No JVD, lungs are clear to auscultation, no pitting edema in bilateral lower extremities, no ascites.     Dry weight is 206 lbs.     Continue Toprol 50 mg po daily, Lisinopril 5 mg po daily, lasix 80 mg po daily.     Not good candidate for Spironolactone at this time.      Not good candidate for anticoagulation due to fall risk.     POLST filled out prior visit. DNR.  "

## 2018-10-18 NOTE — LETTER
[facility] [address] [place] [address]  Phone: [phone]  Fax: [phone]              Bro Gutierrez  1935    Encounter Date: 10/18/2018    Crys Jorge M.D.          PROGRESS NOTE:  Chief Complaint   Patient presents with   • Congestive Heart Failure       Subjective:   Bro Gutierrez is a 83 y.o. male who presents today for cardiac evaluation and care after his prior hospitalization due to HF exacerbation in 12/2016 and again in 12/2017. Patient was seen in the hospital and found to have atrial fibirilllation with rapid ventricular rate.     Patient still gets winded with daily living activities and exertion. No symptoms at rest.     He is taking  Metoprolol ER 50 mg po daily, Lisinopril 5 mg po daily. Lasix 80 mg po daily.      LVEF is 30% on most recent TTE.     Walks with a walker.  Has moved into assisted living now.     No clinical change since last visit.    Past Medical History:   Diagnosis Date   • Atrial fibrillation (HCC)    • Hypertension      History reviewed. No pertinent surgical history.  Family History   Problem Relation Age of Onset   • Heart Disease Mother      Social History     Social History   • Marital status: Unknown     Spouse name: N/A   • Number of children: N/A   • Years of education: N/A     Occupational History   • Not on file.     Social History Main Topics   • Smoking status: Never Smoker   • Smokeless tobacco: Never Used   • Alcohol use Not on file   • Drug use: Unknown   • Sexual activity: Not on file     Other Topics Concern   • Not on file     Social History Narrative   • No narrative on file     Allergies   Allergen Reactions   • Penicillins      Outpatient Encounter Prescriptions as of 10/18/2018   Medication Sig Dispense Refill   • lisinopril (PRINIVIL) 10 MG Tab Take 5 mg by mouth 2 times a day.     • carvedilol (COREG) 3.125 MG Tab Take 6.25 mg by mouth 2 times a day, with meals.     •  celecoxib (CELEBREX) 200 MG Cap Take 200 mg by mouth. 3 TIMES PER WEEK     • loratadine (CLARITIN) 10 MG Tab Take 10 mg by mouth every day.     • Magnesium 400 MG Tab Take  by mouth.     • potassium chloride SA (KDUR) 20 MEQ Tab CR Take 20 mEq by mouth every day.     • aspirin EC (ECOTRIN) 325 MG Tablet Delayed Response   0   • lisinopril (PRINIVIL) 5 MG Tab Take 1 Tab by mouth every day. 90 Tab 3   • DIGITEK 250 MCG Tab Take 250 mcg by mouth.  0   • esomeprazole (NEXIUM) 40 MG delayed-release capsule   0   • fluticasone (FLONASE) 50 MCG/ACT nasal spray   0   • furosemide (LASIX) 80 MG Tab Take 1 Tab by mouth every day.  0   • tamsulosin (FLOMAX) 0.4 MG capsule   0   • gemfibrozil (LOPID) 600 MG Tab TAKE 1 TABLET BY MOUTH TWICE A DAY  0   • finasteride (PROSCAR) 5 MG Tab   0   • azithromycin (ZITHROMAX) 250 MG Tab take 2 tablets by mouth today then take 1 tablet DAILY FOR 4 DAYS  0   • Azelastine HCl 0.15 % Solution Spray 1 Spray in nose 2 Times a Day. Each Nostril  0   • PROAIR  (90 BASE) MCG/ACT Aero Soln inhalation aerosol inhale 2 puffs by mouth every 4 hours  0     No facility-administered encounter medications on file as of 10/18/2018.      Review of Systems   Constitutional: Negative for chills, fever, malaise/fatigue and weight loss.   HENT: Negative for ear discharge, ear pain, hearing loss and nosebleeds.    Eyes: Negative for blurred vision, double vision, pain and discharge.   Respiratory: Negative for cough and shortness of breath.    Cardiovascular: Negative for chest pain, palpitations, orthopnea, claudication, leg swelling and PND.   Gastrointestinal: Negative for abdominal pain, blood in stool, melena, nausea and vomiting.   Genitourinary: Negative for dysuria and hematuria.   Musculoskeletal: Negative for falls, joint pain and myalgias.   Skin: Negative for itching and rash.   Neurological: Negative for dizziness, sensory change, speech change, loss of consciousness and headaches.    "  Endo/Heme/Allergies: Negative for environmental allergies. Does not bruise/bleed easily.   Psychiatric/Behavioral: Negative for depression, hallucinations and suicidal ideas.        Objective:   /54 (BP Location: Left arm, Patient Position: Sitting, BP Cuff Size: Adult)   Pulse 62   Ht 1.88 m (6' 2\")   Wt 93.4 kg (206 lb)   SpO2 90%   BMI 26.45 kg/m²      Physical Exam   Constitutional: He is oriented to person, place, and time. No distress.   HENT:   Head: Normocephalic and atraumatic.   Right Ear: External ear normal.   Left Ear: External ear normal.   Eyes: Right eye exhibits no discharge. Left eye exhibits no discharge.   Neck: No JVD present. No thyromegaly present.   Cardiovascular: Normal rate, regular rhythm, normal heart sounds and intact distal pulses.  Exam reveals no gallop and no friction rub.    No murmur heard.  Pulmonary/Chest: Breath sounds normal. No respiratory distress.   Abdominal: Bowel sounds are normal. He exhibits no distension. There is no tenderness.   Musculoskeletal: He exhibits no edema or tenderness.   Neurological: He is alert and oriented to person, place, and time. No cranial nerve deficit.   Skin: Skin is warm and dry. He is not diaphoretic.   Psychiatric: He has a normal mood and affect. His behavior is normal.   Nursing note and vitals reviewed.      Assessment:     1. ACC/AHA stage C systolic heart failure (HCC)     2. Heart failure, NYHA class 3 (HCC)     3. Chronic atrial fibrillation (HCC)     4. HTN (hypertension), malignant     5. Dyspnea on exertion     6. High risk medication use         Medical Decision Making:  Today's Assessment / Status / Plan:   Today, based on physical examination findings, patient is euvolemic. No JVD, lungs are clear to auscultation, no pitting edema in bilateral lower extremities, no ascites.     Dry weight is 206 lbs.     Continue Toprol 50 mg po daily, Lisinopril 5 mg po daily, lasix 80 mg po daily.     Not good candidate for " Spironolactone at this time.      Not good candidate for anticoagulation due to fall risk.     POLST filled out prior visit. DNR.      Alfredo Peña M.D.  [address] [place] [address]  VIA Facsimile: [phone]

## 2019-02-08 ENCOUNTER — HOSPITAL ENCOUNTER (INPATIENT)
Dept: HOSPITAL 8 - 3NW | Age: 84
LOS: 7 days | Discharge: HOME HEALTH | DRG: 840 | End: 2019-02-15
Attending: INTERNAL MEDICINE | Admitting: HOSPITALIST
Payer: MEDICARE

## 2019-02-08 VITALS — DIASTOLIC BLOOD PRESSURE: 84 MMHG | SYSTOLIC BLOOD PRESSURE: 159 MMHG

## 2019-02-08 VITALS — BODY MASS INDEX: 26.77 KG/M2 | HEIGHT: 74 IN | WEIGHT: 208.56 LBS

## 2019-02-08 DIAGNOSIS — Z92.21: ICD-10-CM

## 2019-02-08 DIAGNOSIS — I50.22: ICD-10-CM

## 2019-02-08 DIAGNOSIS — Q61.3: ICD-10-CM

## 2019-02-08 DIAGNOSIS — E43: ICD-10-CM

## 2019-02-08 DIAGNOSIS — Z79.01: ICD-10-CM

## 2019-02-08 DIAGNOSIS — Z79.899: ICD-10-CM

## 2019-02-08 DIAGNOSIS — Z92.3: ICD-10-CM

## 2019-02-08 DIAGNOSIS — N40.0: ICD-10-CM

## 2019-02-08 DIAGNOSIS — Z79.82: ICD-10-CM

## 2019-02-08 DIAGNOSIS — Z51.5: ICD-10-CM

## 2019-02-08 DIAGNOSIS — I48.0: ICD-10-CM

## 2019-02-08 DIAGNOSIS — Z88.0: ICD-10-CM

## 2019-02-08 DIAGNOSIS — Z80.0: ICD-10-CM

## 2019-02-08 DIAGNOSIS — I42.9: ICD-10-CM

## 2019-02-08 DIAGNOSIS — Z96.653: ICD-10-CM

## 2019-02-08 DIAGNOSIS — I26.99: ICD-10-CM

## 2019-02-08 DIAGNOSIS — Z86.711: ICD-10-CM

## 2019-02-08 DIAGNOSIS — K21.9: ICD-10-CM

## 2019-02-08 DIAGNOSIS — I11.0: ICD-10-CM

## 2019-02-08 DIAGNOSIS — I48.2: ICD-10-CM

## 2019-02-08 PROCEDURE — 87324 CLOSTRIDIUM AG IA: CPT

## 2019-02-08 PROCEDURE — 88305 TISSUE EXAM BY PATHOLOGIST: CPT

## 2019-02-08 PROCEDURE — 82040 ASSAY OF SERUM ALBUMIN: CPT

## 2019-02-08 PROCEDURE — 84100 ASSAY OF PHOSPHORUS: CPT

## 2019-02-08 PROCEDURE — 93005 ELECTROCARDIOGRAM TRACING: CPT

## 2019-02-08 PROCEDURE — 36415 COLL VENOUS BLD VENIPUNCTURE: CPT

## 2019-02-08 PROCEDURE — 85025 COMPLETE CBC W/AUTO DIFF WBC: CPT

## 2019-02-08 PROCEDURE — 83735 ASSAY OF MAGNESIUM: CPT

## 2019-02-08 PROCEDURE — 88342 IMHCHEM/IMCYTCHM 1ST ANTB: CPT

## 2019-02-08 PROCEDURE — 88341 IMHCHEM/IMCYTCHM EA ADD ANTB: CPT

## 2019-02-08 PROCEDURE — 80053 COMPREHEN METABOLIC PANEL: CPT

## 2019-02-08 PROCEDURE — 74177 CT ABD & PELVIS W/CONTRAST: CPT

## 2019-02-08 PROCEDURE — 88360 TUMOR IMMUNOHISTOCHEM/MANUAL: CPT

## 2019-02-08 PROCEDURE — 80048 BASIC METABOLIC PNL TOTAL CA: CPT

## 2019-02-08 PROCEDURE — 80162 ASSAY OF DIGOXIN TOTAL: CPT

## 2019-02-09 VITALS — DIASTOLIC BLOOD PRESSURE: 94 MMHG | SYSTOLIC BLOOD PRESSURE: 143 MMHG

## 2019-02-09 VITALS — SYSTOLIC BLOOD PRESSURE: 150 MMHG | DIASTOLIC BLOOD PRESSURE: 89 MMHG

## 2019-02-09 VITALS — SYSTOLIC BLOOD PRESSURE: 146 MMHG | DIASTOLIC BLOOD PRESSURE: 91 MMHG

## 2019-02-09 VITALS — SYSTOLIC BLOOD PRESSURE: 160 MMHG | DIASTOLIC BLOOD PRESSURE: 91 MMHG

## 2019-02-09 VITALS — DIASTOLIC BLOOD PRESSURE: 94 MMHG | SYSTOLIC BLOOD PRESSURE: 159 MMHG

## 2019-02-09 LAB
ALBUMIN SERPL-MCNC: 2.9 G/DL (ref 3.4–5)
ALP SERPL-CCNC: 82 U/L (ref 45–117)
ALT SERPL-CCNC: 12 U/L (ref 12–78)
ANION GAP SERPL CALC-SCNC: 8 MMOL/L (ref 5–15)
BASOPHILS # BLD AUTO: 0.03 X10^3/UL (ref 0–0.1)
BASOPHILS NFR BLD AUTO: 0 % (ref 0–1)
BILIRUB SERPL-MCNC: 0.4 MG/DL (ref 0.2–1)
CALCIUM SERPL-MCNC: 8.6 MG/DL (ref 8.5–10.1)
CHLORIDE SERPL-SCNC: 102 MMOL/L (ref 98–107)
CREAT SERPL-MCNC: 1.01 MG/DL (ref 0.7–1.3)
EOSINOPHIL # BLD AUTO: 0.03 X10^3/UL (ref 0–0.4)
EOSINOPHIL NFR BLD AUTO: 0 % (ref 1–7)
ERYTHROCYTE [DISTWIDTH] IN BLOOD BY AUTOMATED COUNT: 16.2 % (ref 9.4–14.8)
LYMPHOCYTES # BLD AUTO: 0.56 X10^3/UL (ref 1–3.4)
LYMPHOCYTES NFR BLD AUTO: 4 % (ref 22–44)
MCH RBC QN AUTO: 28.9 PG (ref 27.5–34.5)
MCHC RBC AUTO-ENTMCNC: 32.3 G/DL (ref 33.2–36.2)
MCV RBC AUTO: 89.4 FL (ref 81–97)
MD: NO
MONOCYTES # BLD AUTO: 1.1 X10^3/UL (ref 0.2–0.8)
MONOCYTES NFR BLD AUTO: 9 % (ref 2–9)
NEUTROPHILS # BLD AUTO: 11.02 X10^3/UL (ref 1.8–6.8)
NEUTROPHILS NFR BLD AUTO: 87 % (ref 42–75)
PLATELET # BLD AUTO: 258 X10^3/UL (ref 130–400)
PMV BLD AUTO: 9.4 FL (ref 7.4–10.4)
PROT SERPL-MCNC: 6.5 G/DL (ref 6.4–8.2)
RBC # BLD AUTO: 4.04 X10^6/UL (ref 4.38–5.82)

## 2019-02-09 RX ADMIN — DIGOXIN SCH MG: 250 TABLET ORAL at 15:07

## 2019-02-09 RX ADMIN — PANTOPRAZOLE SODIUM SCH MG: 40 GRANULE, DELAYED RELEASE ORAL at 12:18

## 2019-02-09 RX ADMIN — LISINOPRIL SCH MG: 5 TABLET ORAL at 12:18

## 2019-02-09 RX ADMIN — ENOXAPARIN SODIUM SCH MG: 100 INJECTION SUBCUTANEOUS at 12:19

## 2019-02-09 RX ADMIN — POTASSIUM CHLORIDE SCH MEQ: 20 TABLET, EXTENDED RELEASE ORAL at 12:18

## 2019-02-09 RX ADMIN — MAGNESIUM OXIDE SCH MG: 400 TABLET ORAL at 12:19

## 2019-02-09 RX ADMIN — MAGNESIUM OXIDE SCH MG: 400 TABLET ORAL at 21:39

## 2019-02-09 RX ADMIN — FUROSEMIDE SCH MG: 80 TABLET ORAL at 12:19

## 2019-02-09 RX ADMIN — CETIRIZINE HYDROCHLORIDE SCH MG: 10 TABLET, FILM COATED ORAL at 12:18

## 2019-02-09 RX ADMIN — MAGNESIUM OXIDE SCH MG: 400 TABLET ORAL at 16:55

## 2019-02-09 RX ADMIN — CARVEDILOL SCH MG: 6.25 TABLET, FILM COATED ORAL at 21:39

## 2019-02-09 RX ADMIN — ENOXAPARIN SODIUM SCH MG: 100 INJECTION SUBCUTANEOUS at 21:38

## 2019-02-09 RX ADMIN — CARVEDILOL SCH MG: 6.25 TABLET, FILM COATED ORAL at 12:18

## 2019-02-09 RX ADMIN — PANTOPRAZOLE SODIUM SCH MG: 40 GRANULE, DELAYED RELEASE ORAL at 21:38

## 2019-02-09 RX ADMIN — SODIUM CHLORIDE SCH MLS/HR: 0.9 INJECTION, SOLUTION INTRAVENOUS at 12:18

## 2019-02-10 VITALS — DIASTOLIC BLOOD PRESSURE: 89 MMHG | SYSTOLIC BLOOD PRESSURE: 136 MMHG

## 2019-02-10 VITALS — DIASTOLIC BLOOD PRESSURE: 74 MMHG | SYSTOLIC BLOOD PRESSURE: 127 MMHG

## 2019-02-10 VITALS — DIASTOLIC BLOOD PRESSURE: 78 MMHG | SYSTOLIC BLOOD PRESSURE: 127 MMHG

## 2019-02-10 VITALS — SYSTOLIC BLOOD PRESSURE: 126 MMHG | DIASTOLIC BLOOD PRESSURE: 77 MMHG

## 2019-02-10 LAB
ALBUMIN SERPL-MCNC: 2.9 G/DL (ref 3.4–5)
ANION GAP SERPL CALC-SCNC: 7 MMOL/L (ref 5–15)
CALCIUM SERPL-MCNC: 8.5 MG/DL (ref 8.5–10.1)
CHLORIDE SERPL-SCNC: 102 MMOL/L (ref 98–107)
CLOSTRIDIUM DIFFICILE ANTIGEN: NEGATIVE
CLOSTRIDIUM DIFFICILE TOXIN: NEGATIVE
CREAT SERPL-MCNC: 1.12 MG/DL (ref 0.7–1.3)

## 2019-02-10 RX ADMIN — CARVEDILOL SCH MG: 6.25 TABLET, FILM COATED ORAL at 21:23

## 2019-02-10 RX ADMIN — FUROSEMIDE SCH MG: 80 TABLET ORAL at 08:10

## 2019-02-10 RX ADMIN — SODIUM CHLORIDE SCH MLS/HR: 0.9 INJECTION, SOLUTION INTRAVENOUS at 16:06

## 2019-02-10 RX ADMIN — DIGOXIN SCH MG: 250 TABLET ORAL at 08:10

## 2019-02-10 RX ADMIN — ENOXAPARIN SODIUM SCH MG: 100 INJECTION SUBCUTANEOUS at 22:49

## 2019-02-10 RX ADMIN — POTASSIUM CHLORIDE SCH MEQ: 20 TABLET, EXTENDED RELEASE ORAL at 08:09

## 2019-02-10 RX ADMIN — LISINOPRIL SCH MG: 5 TABLET ORAL at 08:10

## 2019-02-10 RX ADMIN — CETIRIZINE HYDROCHLORIDE SCH MG: 10 TABLET, FILM COATED ORAL at 08:11

## 2019-02-10 RX ADMIN — SODIUM CHLORIDE SCH MLS/HR: 0.9 INJECTION, SOLUTION INTRAVENOUS at 00:20

## 2019-02-10 RX ADMIN — MAGNESIUM OXIDE SCH MG: 400 TABLET ORAL at 16:05

## 2019-02-10 RX ADMIN — PANTOPRAZOLE SODIUM SCH MG: 40 GRANULE, DELAYED RELEASE ORAL at 08:09

## 2019-02-10 RX ADMIN — MAGNESIUM OXIDE SCH MG: 400 TABLET ORAL at 21:23

## 2019-02-10 RX ADMIN — FINASTERIDE SCH MG: 5 TABLET, FILM COATED ORAL at 08:10

## 2019-02-10 RX ADMIN — PANTOPRAZOLE SODIUM SCH MG: 40 GRANULE, DELAYED RELEASE ORAL at 21:24

## 2019-02-10 RX ADMIN — CARVEDILOL SCH MG: 6.25 TABLET, FILM COATED ORAL at 08:09

## 2019-02-10 RX ADMIN — MAGNESIUM OXIDE SCH MG: 400 TABLET ORAL at 08:10

## 2019-02-10 RX ADMIN — ENOXAPARIN SODIUM SCH MG: 100 INJECTION SUBCUTANEOUS at 11:14

## 2019-02-11 VITALS — SYSTOLIC BLOOD PRESSURE: 142 MMHG | DIASTOLIC BLOOD PRESSURE: 82 MMHG

## 2019-02-11 VITALS — SYSTOLIC BLOOD PRESSURE: 116 MMHG | DIASTOLIC BLOOD PRESSURE: 70 MMHG

## 2019-02-11 VITALS — SYSTOLIC BLOOD PRESSURE: 121 MMHG | DIASTOLIC BLOOD PRESSURE: 70 MMHG

## 2019-02-11 VITALS — SYSTOLIC BLOOD PRESSURE: 119 MMHG | DIASTOLIC BLOOD PRESSURE: 71 MMHG

## 2019-02-11 VITALS — DIASTOLIC BLOOD PRESSURE: 73 MMHG | SYSTOLIC BLOOD PRESSURE: 133 MMHG

## 2019-02-11 PROCEDURE — 0DB68ZX EXCISION OF STOMACH, VIA NATURAL OR ARTIFICIAL OPENING ENDOSCOPIC, DIAGNOSTIC: ICD-10-PCS | Performed by: INTERNAL MEDICINE

## 2019-02-11 RX ADMIN — MAGNESIUM OXIDE SCH MG: 400 TABLET ORAL at 10:24

## 2019-02-11 RX ADMIN — MAGNESIUM OXIDE SCH MG: 400 TABLET ORAL at 16:28

## 2019-02-11 RX ADMIN — LISINOPRIL SCH MG: 5 TABLET ORAL at 10:24

## 2019-02-11 RX ADMIN — DIGOXIN SCH MG: 250 TABLET ORAL at 10:24

## 2019-02-11 RX ADMIN — CETIRIZINE HYDROCHLORIDE SCH MG: 10 TABLET, FILM COATED ORAL at 10:24

## 2019-02-11 RX ADMIN — FINASTERIDE SCH MG: 5 TABLET, FILM COATED ORAL at 10:23

## 2019-02-11 RX ADMIN — ENOXAPARIN SODIUM SCH MG: 100 INJECTION SUBCUTANEOUS at 10:25

## 2019-02-11 RX ADMIN — SODIUM CHLORIDE SCH MLS/HR: 0.9 INJECTION, SOLUTION INTRAVENOUS at 06:44

## 2019-02-11 RX ADMIN — SODIUM CHLORIDE SCH MLS/HR: 0.9 INJECTION, SOLUTION INTRAVENOUS at 20:32

## 2019-02-11 RX ADMIN — PANTOPRAZOLE SODIUM SCH MG: 40 GRANULE, DELAYED RELEASE ORAL at 08:00

## 2019-02-11 RX ADMIN — CARVEDILOL SCH MG: 6.25 TABLET, FILM COATED ORAL at 20:22

## 2019-02-11 RX ADMIN — CARVEDILOL SCH MG: 6.25 TABLET, FILM COATED ORAL at 10:24

## 2019-02-11 RX ADMIN — ENOXAPARIN SODIUM SCH MG: 100 INJECTION SUBCUTANEOUS at 22:08

## 2019-02-11 RX ADMIN — MAGNESIUM OXIDE SCH MG: 400 TABLET ORAL at 20:21

## 2019-02-11 RX ADMIN — PANTOPRAZOLE SODIUM SCH MG: 40 GRANULE, DELAYED RELEASE ORAL at 20:21

## 2019-02-11 RX ADMIN — FUROSEMIDE SCH MG: 80 TABLET ORAL at 10:23

## 2019-02-11 RX ADMIN — POTASSIUM CHLORIDE SCH MEQ: 20 TABLET, EXTENDED RELEASE ORAL at 10:24

## 2019-02-12 VITALS — SYSTOLIC BLOOD PRESSURE: 145 MMHG | DIASTOLIC BLOOD PRESSURE: 75 MMHG

## 2019-02-12 VITALS — DIASTOLIC BLOOD PRESSURE: 74 MMHG | SYSTOLIC BLOOD PRESSURE: 128 MMHG

## 2019-02-12 VITALS — DIASTOLIC BLOOD PRESSURE: 72 MMHG | SYSTOLIC BLOOD PRESSURE: 123 MMHG

## 2019-02-12 VITALS — DIASTOLIC BLOOD PRESSURE: 66 MMHG | SYSTOLIC BLOOD PRESSURE: 110 MMHG

## 2019-02-12 RX ADMIN — CARVEDILOL SCH MG: 6.25 TABLET, FILM COATED ORAL at 08:50

## 2019-02-12 RX ADMIN — MAGNESIUM OXIDE SCH MG: 400 TABLET ORAL at 20:03

## 2019-02-12 RX ADMIN — CETIRIZINE HYDROCHLORIDE SCH MG: 10 TABLET, FILM COATED ORAL at 08:50

## 2019-02-12 RX ADMIN — MAGNESIUM OXIDE SCH MG: 400 TABLET ORAL at 16:24

## 2019-02-12 RX ADMIN — DIGOXIN SCH MG: 250 TABLET ORAL at 08:51

## 2019-02-12 RX ADMIN — MAGNESIUM OXIDE SCH MG: 400 TABLET ORAL at 08:51

## 2019-02-12 RX ADMIN — FINASTERIDE SCH MG: 5 TABLET, FILM COATED ORAL at 08:50

## 2019-02-12 RX ADMIN — CARVEDILOL SCH MG: 6.25 TABLET, FILM COATED ORAL at 20:04

## 2019-02-12 RX ADMIN — ENOXAPARIN SODIUM SCH MG: 100 INJECTION SUBCUTANEOUS at 11:06

## 2019-02-12 RX ADMIN — LISINOPRIL SCH MG: 5 TABLET ORAL at 08:51

## 2019-02-12 RX ADMIN — SODIUM CHLORIDE SCH MLS/HR: 0.9 INJECTION, SOLUTION INTRAVENOUS at 08:50

## 2019-02-12 RX ADMIN — ENOXAPARIN SODIUM SCH MG: 100 INJECTION SUBCUTANEOUS at 22:06

## 2019-02-12 RX ADMIN — PANTOPRAZOLE SODIUM SCH MG: 40 GRANULE, DELAYED RELEASE ORAL at 08:50

## 2019-02-12 RX ADMIN — SODIUM CHLORIDE SCH MLS/HR: 0.9 INJECTION, SOLUTION INTRAVENOUS at 22:06

## 2019-02-12 RX ADMIN — PANTOPRAZOLE SODIUM SCH MG: 40 GRANULE, DELAYED RELEASE ORAL at 20:04

## 2019-02-12 RX ADMIN — FUROSEMIDE SCH MG: 80 TABLET ORAL at 08:51

## 2019-02-12 RX ADMIN — POTASSIUM CHLORIDE SCH MEQ: 20 TABLET, EXTENDED RELEASE ORAL at 08:50

## 2019-02-13 VITALS — DIASTOLIC BLOOD PRESSURE: 81 MMHG | SYSTOLIC BLOOD PRESSURE: 132 MMHG

## 2019-02-13 VITALS — DIASTOLIC BLOOD PRESSURE: 79 MMHG | SYSTOLIC BLOOD PRESSURE: 130 MMHG

## 2019-02-13 VITALS — DIASTOLIC BLOOD PRESSURE: 67 MMHG | SYSTOLIC BLOOD PRESSURE: 105 MMHG

## 2019-02-13 VITALS — SYSTOLIC BLOOD PRESSURE: 135 MMHG | DIASTOLIC BLOOD PRESSURE: 75 MMHG

## 2019-02-13 VITALS — SYSTOLIC BLOOD PRESSURE: 143 MMHG | DIASTOLIC BLOOD PRESSURE: 93 MMHG

## 2019-02-13 LAB
BASOPHILS # BLD AUTO: 0.02 X10^3/UL (ref 0–0.1)
BASOPHILS NFR BLD AUTO: 0 % (ref 0–1)
EOSINOPHIL # BLD AUTO: 0.02 X10^3/UL (ref 0–0.4)
EOSINOPHIL NFR BLD AUTO: 0 % (ref 1–7)
ERYTHROCYTE [DISTWIDTH] IN BLOOD BY AUTOMATED COUNT: 16.5 % (ref 9.4–14.8)
LYMPHOCYTES # BLD AUTO: 0.61 X10^3/UL (ref 1–3.4)
LYMPHOCYTES NFR BLD AUTO: 8 % (ref 22–44)
MCH RBC QN AUTO: 27.9 PG (ref 27.5–34.5)
MCHC RBC AUTO-ENTMCNC: 31 G/DL (ref 33.2–36.2)
MCV RBC AUTO: 89.9 FL (ref 81–97)
MD: NO
MONOCYTES # BLD AUTO: 0.54 X10^3/UL (ref 0.2–0.8)
MONOCYTES NFR BLD AUTO: 7 % (ref 2–9)
NEUTROPHILS # BLD AUTO: 6.47 X10^3/UL (ref 1.8–6.8)
NEUTROPHILS NFR BLD AUTO: 84 % (ref 42–75)
PLATELET # BLD AUTO: 213 X10^3/UL (ref 130–400)
PMV BLD AUTO: 10 FL (ref 7.4–10.4)
RBC # BLD AUTO: 3.62 X10^6/UL (ref 4.38–5.82)

## 2019-02-13 RX ADMIN — FUROSEMIDE SCH MG: 80 TABLET ORAL at 10:12

## 2019-02-13 RX ADMIN — CETIRIZINE HYDROCHLORIDE SCH MG: 10 TABLET, FILM COATED ORAL at 10:12

## 2019-02-13 RX ADMIN — POTASSIUM CHLORIDE SCH MEQ: 20 TABLET, EXTENDED RELEASE ORAL at 10:11

## 2019-02-13 RX ADMIN — FINASTERIDE SCH MG: 5 TABLET, FILM COATED ORAL at 10:12

## 2019-02-13 RX ADMIN — PANTOPRAZOLE SODIUM SCH MG: 40 GRANULE, DELAYED RELEASE ORAL at 10:12

## 2019-02-13 RX ADMIN — APIXABAN SCH MG: 5 TABLET, FILM COATED ORAL at 10:11

## 2019-02-13 RX ADMIN — MAGNESIUM OXIDE SCH MG: 400 TABLET ORAL at 10:12

## 2019-02-13 RX ADMIN — CARVEDILOL SCH MG: 6.25 TABLET, FILM COATED ORAL at 10:11

## 2019-02-13 RX ADMIN — PANTOPRAZOLE SODIUM SCH MG: 40 GRANULE, DELAYED RELEASE ORAL at 20:11

## 2019-02-13 RX ADMIN — MAGNESIUM OXIDE SCH MG: 400 TABLET ORAL at 16:00

## 2019-02-13 RX ADMIN — APIXABAN SCH MG: 5 TABLET, FILM COATED ORAL at 20:11

## 2019-02-13 RX ADMIN — MAGNESIUM OXIDE SCH MG: 400 TABLET ORAL at 20:11

## 2019-02-13 RX ADMIN — LISINOPRIL SCH MG: 5 TABLET ORAL at 10:12

## 2019-02-13 RX ADMIN — CARVEDILOL SCH MG: 6.25 TABLET, FILM COATED ORAL at 20:11

## 2019-02-13 RX ADMIN — DIGOXIN SCH MG: 250 TABLET ORAL at 10:11

## 2019-02-14 VITALS — SYSTOLIC BLOOD PRESSURE: 147 MMHG | DIASTOLIC BLOOD PRESSURE: 80 MMHG

## 2019-02-14 VITALS — SYSTOLIC BLOOD PRESSURE: 146 MMHG | DIASTOLIC BLOOD PRESSURE: 94 MMHG

## 2019-02-14 VITALS — DIASTOLIC BLOOD PRESSURE: 90 MMHG | SYSTOLIC BLOOD PRESSURE: 156 MMHG

## 2019-02-14 VITALS — DIASTOLIC BLOOD PRESSURE: 85 MMHG | SYSTOLIC BLOOD PRESSURE: 157 MMHG

## 2019-02-14 RX ADMIN — MAGNESIUM OXIDE SCH MG: 400 TABLET ORAL at 09:00

## 2019-02-14 RX ADMIN — FINASTERIDE SCH MG: 5 TABLET, FILM COATED ORAL at 09:00

## 2019-02-14 RX ADMIN — CETIRIZINE HYDROCHLORIDE SCH MG: 10 TABLET, FILM COATED ORAL at 09:00

## 2019-02-14 RX ADMIN — CARVEDILOL SCH MG: 6.25 TABLET, FILM COATED ORAL at 09:00

## 2019-02-14 RX ADMIN — MAGNESIUM OXIDE SCH MG: 400 TABLET ORAL at 20:49

## 2019-02-14 RX ADMIN — CARVEDILOL SCH MG: 6.25 TABLET, FILM COATED ORAL at 20:49

## 2019-02-14 RX ADMIN — POTASSIUM CHLORIDE SCH MEQ: 20 TABLET, EXTENDED RELEASE ORAL at 09:00

## 2019-02-14 RX ADMIN — DIGOXIN SCH MG: 250 TABLET ORAL at 09:00

## 2019-02-14 RX ADMIN — MENTHOL, METHYL SALICYLATE SCH APPLIC: 10; 15 CREAM TOPICAL at 15:51

## 2019-02-14 RX ADMIN — MENTHOL, METHYL SALICYLATE SCH APPLIC: 10; 15 CREAM TOPICAL at 11:10

## 2019-02-14 RX ADMIN — APIXABAN SCH MG: 5 TABLET, FILM COATED ORAL at 09:00

## 2019-02-14 RX ADMIN — LISINOPRIL SCH MG: 5 TABLET ORAL at 09:00

## 2019-02-14 RX ADMIN — MAGNESIUM OXIDE SCH MG: 400 TABLET ORAL at 15:50

## 2019-02-14 RX ADMIN — FUROSEMIDE SCH MG: 80 TABLET ORAL at 09:00

## 2019-02-14 RX ADMIN — PANTOPRAZOLE SODIUM SCH MG: 40 GRANULE, DELAYED RELEASE ORAL at 09:00

## 2019-02-14 RX ADMIN — PANTOPRAZOLE SODIUM SCH MG: 40 GRANULE, DELAYED RELEASE ORAL at 20:49

## 2019-02-14 RX ADMIN — APIXABAN SCH MG: 5 TABLET, FILM COATED ORAL at 20:49

## 2019-02-14 RX ADMIN — MENTHOL, METHYL SALICYLATE SCH APPLIC: 10; 15 CREAM TOPICAL at 20:52

## 2019-02-15 VITALS — DIASTOLIC BLOOD PRESSURE: 91 MMHG | SYSTOLIC BLOOD PRESSURE: 161 MMHG

## 2019-02-15 VITALS — SYSTOLIC BLOOD PRESSURE: 148 MMHG | DIASTOLIC BLOOD PRESSURE: 89 MMHG

## 2019-02-15 VITALS — DIASTOLIC BLOOD PRESSURE: 104 MMHG | SYSTOLIC BLOOD PRESSURE: 153 MMHG

## 2019-02-15 RX ADMIN — DIGOXIN SCH MG: 250 TABLET ORAL at 09:00

## 2019-02-15 RX ADMIN — CETIRIZINE HYDROCHLORIDE SCH MG: 10 TABLET, FILM COATED ORAL at 09:00

## 2019-02-15 RX ADMIN — MENTHOL, METHYL SALICYLATE SCH APPLIC: 10; 15 CREAM TOPICAL at 05:43

## 2019-02-15 RX ADMIN — MAGNESIUM OXIDE SCH MG: 400 TABLET ORAL at 09:00

## 2019-02-15 RX ADMIN — APIXABAN SCH MG: 5 TABLET, FILM COATED ORAL at 08:32

## 2019-02-15 RX ADMIN — FINASTERIDE SCH MG: 5 TABLET, FILM COATED ORAL at 08:33

## 2019-02-15 RX ADMIN — PANTOPRAZOLE SODIUM SCH MG: 40 GRANULE, DELAYED RELEASE ORAL at 09:00

## 2019-02-15 RX ADMIN — MENTHOL, METHYL SALICYLATE SCH APPLIC: 10; 15 CREAM TOPICAL at 11:05

## 2019-02-15 RX ADMIN — FUROSEMIDE SCH MG: 80 TABLET ORAL at 09:00

## 2019-02-15 RX ADMIN — LISINOPRIL SCH MG: 5 TABLET ORAL at 08:32

## 2019-02-15 RX ADMIN — CARVEDILOL SCH MG: 6.25 TABLET, FILM COATED ORAL at 09:00

## 2019-02-15 RX ADMIN — POTASSIUM CHLORIDE SCH MEQ: 20 TABLET, EXTENDED RELEASE ORAL at 09:00
